# Patient Record
Sex: MALE | Race: WHITE | Employment: OTHER | URBAN - NONMETROPOLITAN AREA
[De-identification: names, ages, dates, MRNs, and addresses within clinical notes are randomized per-mention and may not be internally consistent; named-entity substitution may affect disease eponyms.]

---

## 2017-09-20 ENCOUNTER — COMMUNICATION - GICH (OUTPATIENT)
Dept: FAMILY MEDICINE | Facility: OTHER | Age: 62
End: 2017-09-20

## 2017-09-20 DIAGNOSIS — I10 ESSENTIAL (PRIMARY) HYPERTENSION: ICD-10-CM

## 2017-11-06 ENCOUNTER — HISTORY (OUTPATIENT)
Dept: FAMILY MEDICINE | Facility: OTHER | Age: 62
End: 2017-11-06

## 2017-11-06 ENCOUNTER — OFFICE VISIT - GICH (OUTPATIENT)
Dept: FAMILY MEDICINE | Facility: OTHER | Age: 62
End: 2017-11-06

## 2017-11-06 DIAGNOSIS — E11.9 TYPE 2 DIABETES MELLITUS WITHOUT COMPLICATIONS (H): ICD-10-CM

## 2017-11-06 DIAGNOSIS — Z00.00 ENCOUNTER FOR GENERAL ADULT MEDICAL EXAMINATION WITHOUT ABNORMAL FINDINGS: ICD-10-CM

## 2017-11-06 DIAGNOSIS — I10 ESSENTIAL (PRIMARY) HYPERTENSION: ICD-10-CM

## 2017-11-06 LAB
A/G RATIO - HISTORICAL: 1.3 (ref 1–2)
ABSOLUTE BASOPHILS - HISTORICAL: 0.1 THOU/CU MM
ABSOLUTE EOSINOPHILS - HISTORICAL: 0.3 THOU/CU MM
ABSOLUTE IMMATURE GRANULOCYTES(METAS,MYELOS,PROS) - HISTORICAL: 0 THOU/CU MM
ABSOLUTE LYMPHOCYTES - HISTORICAL: 1.4 THOU/CU MM (ref 0.9–2.9)
ABSOLUTE MONOCYTES - HISTORICAL: 0.4 THOU/CU MM
ABSOLUTE NEUTROPHILS - HISTORICAL: 2.4 THOU/CU MM (ref 1.7–7)
ALBUMIN SERPL-MCNC: 4.3 G/DL (ref 3.5–5.7)
ALP SERPL-CCNC: 60 IU/L (ref 34–104)
ALT (SGPT) - HISTORICAL: 17 IU/L (ref 7–52)
ANION GAP - HISTORICAL: 13 (ref 5–18)
AST SERPL-CCNC: 16 IU/L (ref 13–39)
BASOPHILS # BLD AUTO: 1.1 %
BILIRUB SERPL-MCNC: 0.4 MG/DL (ref 0.3–1)
BILIRUB UR QL: NEGATIVE
BUN SERPL-MCNC: 19 MG/DL (ref 7–25)
BUN/CREAT RATIO - HISTORICAL: 18
CALCIUM SERPL-MCNC: 9.8 MG/DL (ref 8.6–10.3)
CHLORIDE SERPLBLD-SCNC: 103 MMOL/L (ref 98–107)
CHOL/HDL RATIO - HISTORICAL: 5.11
CHOLESTEROL TOTAL: 184 MG/DL
CLARITY, URINE: CLEAR CLARITY
CO2 SERPL-SCNC: 25 MMOL/L (ref 21–31)
COLOR UR: YELLOW COLOR
CREAT SERPL-MCNC: 1.06 MG/DL (ref 0.7–1.3)
EOSINOPHIL NFR BLD AUTO: 7.5 %
ERYTHROCYTE [DISTWIDTH] IN BLOOD BY AUTOMATED COUNT: 12.9 % (ref 11.5–15.5)
ESTIMATED AVERAGE GLUCOSE: 126 MG/DL
GFR IF NOT AFRICAN AMERICAN - HISTORICAL: >60 ML/MIN/1.73M2
GLOBULIN - HISTORICAL: 3.3 G/DL (ref 2–3.7)
GLUCOSE SERPL-MCNC: 145 MG/DL (ref 70–105)
GLUCOSE URINE: NEGATIVE MG/DL
HCT VFR BLD AUTO: 38.6 % (ref 37–53)
HDLC SERPL-MCNC: 36 MG/DL (ref 23–92)
HEMOGLOBIN A1C MONITORING (POCT) - HISTORICAL: 6 % (ref 4–6.2)
HEMOGLOBIN: 13.2 G/DL (ref 13.5–17.5)
IMMATURE GRANULOCYTES(METAS,MYELOS,PROS) - HISTORICAL: 0 %
KETONES UR QL: NEGATIVE MG/DL
LDLC SERPL CALC-MCNC: 121 MG/DL
LEUKOCYTE ESTERASE URINE: NEGATIVE
LYMPHOCYTES NFR BLD AUTO: 30.6 % (ref 20–44)
MCH RBC QN AUTO: 30.9 PG (ref 26–34)
MCHC RBC AUTO-ENTMCNC: 34.2 G/DL (ref 32–36)
MCV RBC AUTO: 90 FL (ref 80–100)
MONOCYTES NFR BLD AUTO: 8.6 %
NEUTROPHILS NFR BLD AUTO: 52.2 % (ref 42–72)
NITRITE UR QL STRIP: NEGATIVE
NON-HDL CHOLESTEROL - HISTORICAL: 148 MG/DL
OCCULT BLOOD,URINE - HISTORICAL: NEGATIVE
PH UR: 6 [PH]
PLATELET # BLD AUTO: 192 THOU/CU MM (ref 140–440)
PMV BLD: 9.6 FL (ref 6.5–11)
POTASSIUM SERPL-SCNC: 4.4 MMOL/L (ref 3.5–5.1)
PROT SERPL-MCNC: 7.6 G/DL (ref 6.4–8.9)
PROTEIN QUALITATIVE,URINE - HISTORICAL: NEGATIVE MG/DL
PROVIDER ORDERDED STATUS - HISTORICAL: ABNORMAL
RED BLOOD COUNT - HISTORICAL: 4.27 MIL/CU MM (ref 4.3–5.9)
SODIUM SERPL-SCNC: 141 MMOL/L (ref 133–143)
SP GR UR STRIP: 1.02
TRIGL SERPL-MCNC: 135 MG/DL
TSH - HISTORICAL: 1.36 UIU/ML (ref 0.34–5.6)
UROBILINOGEN,QUALITATIVE - HISTORICAL: NORMAL EU/DL
WHITE BLOOD COUNT - HISTORICAL: 4.5 THOU/CU MM (ref 4.5–11)

## 2017-12-28 NOTE — PROGRESS NOTES
Patient Information     Patient Name MRN Rosalio Ramachandran [id number] Male 1955      Progress Notes by Viral Mora MD at 2017  7:45 AM     Author:  Viral Mora MD Service:  (none) Author Type:  Physician     Filed:  2017  8:33 AM Encounter Date:  2017 Status:  Signed     :  Viral Mora MD (Physician)            ,

## 2017-12-28 NOTE — TELEPHONE ENCOUNTER
Patient Information     Patient Name MRN Sex Rosalio Alicia [id number] Male 1955      Telephone Encounter by Damaris Martin RN at 2017 11:18 AM     Author:  Damaris Martin RN Service:  (none) Author Type:  NURS- Registered Nurse     Filed:  2017 11:20 AM Encounter Date:  2017 Status:  Signed     :  Damaris Martin RN (NURS- Registered Nurse)            Ace Inhibitors    Office visit in the past 12 months or per provider note.    Last visit with SHAYLEE OLMEDO was on: 10/18/2016 in [facility]  Next visit with SHAYLEE OLMEDO is on: 2017 in [facility]  Next visit with Family Practice is on: 2017 in [facility]    Lab test requirements:  Creatinine and Potassium annually, if ordering lab, order BMP.  CREATININE (mg/dL)    Date Value   10/13/2015 0.98     POTASSIUM (mmol/L)    Date Value   10/13/2015 4.3       Max refill for 12 months from last office visit or per provider note    Blood pressure addressed at annual exam last year. Controlled, no changes made.    Patient is due for medication management appointment. Limited refill provided at this time. Noted upcoming appointment. Prescription refilled per RN Medication Refill Policy.................... Damaris Martin RN ....................  2017   11:19 AM

## 2017-12-29 NOTE — H&P
Patient Information     Patient Name MRN Rosalio Ramachandran [id number] Male 1955      H&P by Viral Mora MD at 2017  7:45 AM     Author:  Viral Mora MD Service:  (none) Author Type:  Physician     Filed:  2017  8:33 AM Encounter Date:  2017 Status:  Signed     :  Viral Mora MD (Physician)            Nursing Notes:   Princess Stack  2017  7:54 AM  Signed  Patient presents to the clinic for an annual physical  Princess Stack LPN....................2017 7:44 AM    Previous A1C is at goal of <8  HEMOGLOBIN A1C MONITORING (POCT) (%)    Date Value   10/18/2016 6.7 (H)     Urine microalbumin:creatine:   Foot exam today  Eye exam 2017    Patient is not a current smoker  Patient is on a daily aspirin  Patient is not on a Statin.  Blood pressure today of 136/76 is at the goal of <139/89 for diabetics.    Princess Stack LPN..............2017 7:46 AM    Rosalio Pierre is a 61 y.o. male who presents for   Chief Complaint     Patient presents with       Physical      Annual px     HPI: Mr. Pierre comes in stating he has had good BPs at home but sugars have gone up some with AM of 125 and later in the day he will get 120-140... He is due for A1C. He did see eye Dr this summer with satisfactory exam. He has no foot problems. Hehas no complaints today. He is due for labs. Lipids last year showed LDL of 113. He does exercise regularly.. We discussed diet some. He [place]aid in [place] and is retired from "[employer]".   No past medical history on file.  Past Surgical History:      Procedure  Laterality Date     COLONOSCOPY      normal - [place]       PAST SURGICAL HISTORY  2009    Umbilical herniorrhaphy with mesh underlay       Family History       Problem   Relation Age of Onset     Other  Father 57     parkinsons  81       Current Outpatient Prescriptions       Medication  Sig Dispense Refill     aspirin (ECOTRIN) 81 mg enteric coated tablet Take 1 tablet by  "mouth once daily with a meal.  0     blood sugar diagnostic (BLOOD GLUCOSE TEST) strip Dispense item covered by pt ins. 250.00 NIDDM type II - Test 1 time/day 100 Each 3     lancets (ACCU-CHEK MULTICLIX LANCET) Dispense item covered by pt ins. 250.00 NIDDM type II - Test 1 time/day 90 Each 3     metFORMIN (GLUCOPHAGE) 500 mg tablet 1 tablet in the AM and 2 in the  tablet 3     multivitamin (MVI) tablet Take 1 tablet by mouth once daily.  0     ramipril (ALTACE) 10 mg capsule TAKE 1 CAPSULE BY MOUTH EVERY DAY 90 capsule 0     No current facility-administered medications for this visit.      Medications have been reviewed by me and are current to the best of my knowledge and ability.    No Known Allergies  REVIEW OF SYSTEMS:  Constitutional: Negative  Eyes: see above  Ears, nose, mouth, throat, and face: reduced/ uses head phones for TV  Respiratory: Negative  Cardiovascular: Negative  Gastrointestinal: Negative  Genitourinary:Negative  Integument/breast: Negative  Hematologic/lymphatic: Negative  Musculoskeletal:Negative some R hip pain rarely that does not slow him  Neurological: Negative  Psychiatric: Negative  Allergic/Immunologic: Negative     EXAM:   Vitals:     11/06/17 0745   BP: 136/76   Weight: 88.5 kg (195 lb 3.2 oz)   Height: 1.765 m (5' 9.5\")     General Appearance: No distress - comfortable  Head Exam: Normocephalic, without obvious abnormality.  Ear Exam: Normal TM's bilaterally. Normal auditory canals and external ears. Non-tender.  OroPharynx Exam: Dental hygiene adequate. Normal buccal mucosa. Normal pharynx.  Neck Exam: Supple, no masses or nodes.  Thyroid Exam: No nodules or enlargement.  Chest/Respiratory Exam: Normal chest wall and respirations. Clear to auscultation.  Cardiovascular Exam: Regular rate and rhythm. S1, S2, no murmur, click, gallop, or rubs.  Gastrointestinal Exam: Soft, nontender, no abnormal masses or organomegaly.  Rectal Exam: good sphincter tone  Genitourinary Exam Male: " Normal.  Lymphatic Exam: Non-palpable nodes in neck, clavicular, axillary, or inguinal regions.  Foot Exam: Left and right foot: monofilament sensation normal, good pedal pulses, no lesions, nail hygiene good.  Skin: no rash or abnormalities  Neurologic Exam: Nonfocal;  normal gross motor movement, tone, and coordination. No tremor.  Psychiatric Exam: Alert and oriented, appropriate affect.  ASSESSMENT AND PLAN:  1. HTN (hypertension)  controlled  - ramipril (ALTACE) 10 mg capsule; Take 1 capsule by mouth once daily.  Dispense: 90 capsule; Refill: 3    2. Controlled type 2 diabetes mellitus without complication, without long-term current use of insulin (HC)  Lab pending- may increase metformin to 1000 bid  - metFORMIN (GLUCOPHAGE) 500 mg tablet; 1 tablet in the AM and 2 in the PM  Dispense: 270 tablet; Refill: 3  - blood sugar diagnostic (BLOOD GLUCOSE TEST) strip; Dispense item covered by pt ins. 250.00 NIDDM type II - Test 1 time/day  Dispense: 100 Each; Refill: 3    3. Health care maintenance  lab

## 2017-12-30 NOTE — NURSING NOTE
Patient Information     Patient Name MRN Sex Rosalio Alicia [id number] Male 1955      Nursing Note by Princess Stack at 2017  7:45 AM     Author:  Princess Stack Service:  (none) Author Type:  (none)     Filed:  2017  7:54 AM Encounter Date:  2017 Status:  Signed     :  Princess Stack            Patient presents to the clinic for an annual physical  Princess Stack LPN....................2017 7:44 AM    Previous A1C is at goal of <8  HEMOGLOBIN A1C MONITORING (POCT) (%)    Date Value   10/18/2016 6.7 (H)     Urine microalbumin:creatine:   Foot exam today  Eye exam 2017    Patient is not a current smoker  Patient is on a daily aspirin  Patient is not on a Statin.  Blood pressure today of 136/76 is at the goal of <139/89 for diabetics.    Princess Stack LPN..............2017 7:46 AM

## 2018-01-27 VITALS
DIASTOLIC BLOOD PRESSURE: 76 MMHG | BODY MASS INDEX: 27.94 KG/M2 | SYSTOLIC BLOOD PRESSURE: 136 MMHG | HEIGHT: 70 IN | WEIGHT: 195.2 LBS

## 2018-02-19 ENCOUNTER — DOCUMENTATION ONLY (OUTPATIENT)
Dept: FAMILY MEDICINE | Facility: OTHER | Age: 63
End: 2018-02-19

## 2018-02-19 RX ORDER — DIPHENOXYLATE HYDROCHLORIDE AND ATROPINE SULFATE 2.5; .025 MG/1; MG/1
1 TABLET ORAL DAILY
COMMUNITY

## 2018-02-19 RX ORDER — ASPIRIN 81 MG/1
81 TABLET ORAL
COMMUNITY

## 2018-02-19 RX ORDER — RAMIPRIL 10 MG/1
10 CAPSULE ORAL DAILY
COMMUNITY
Start: 2017-11-06 | End: 2018-10-18

## 2018-06-26 ENCOUNTER — TRANSFERRED RECORDS (OUTPATIENT)
Dept: HEALTH INFORMATION MANAGEMENT | Facility: OTHER | Age: 63
End: 2018-06-26

## 2018-07-23 NOTE — PROGRESS NOTES
Patient Information     Patient Name  Rosalio Pierre MRN  [id number] Sex  Male   1955      Letter by Viral Mora MD at      Author:  Viral Mora MD Service:  (none) Author Type:  (none)    Filed:   Encounter Date:  2017 Status:  (Other)           Rosalio Pierre  [address]  [place] [address]          2017    Dear Mr. Pierre:    Your labs look even better than last year but please eat less red meat. Keep up the good work.  Viral Mora MD ....................  2017   11:57 AM     Results for orders placed or performed in visit on 17       COMP METABOLIC PANEL       Result  Value Ref Range Status    SODIUM 141 133 - 143 mmol/L Final    POTASSIUM 4.4 3.5 - 5.1 mmol/L Final    CHLORIDE 103 98 - 107 mmol/L Final    CO2,TOTAL 25 21 - 31 mmol/L Final    ANION GAP 13 5 - 18                 Final    GLUCOSE 145 (H) 70 - 105 mg/dL Final    CALCIUM 9.8 8.6 - 10.3 mg/dL Final    BUN 19 7 - 25 mg/dL Final    CREATININE 1.06 0.70 - 1.30 mg/dL Final    BUN/CREAT RATIO           18                 Final    GFR if African American >60 >60 ml/min/1.73m2 Final    GFR if not African American >60 >60 ml/min/1.73m2 Final    ALBUMIN 4.3 3.5 - 5.7 g/dL Final    PROTEIN,TOTAL 7.6 6.4 - 8.9 g/dL Final    GLOBULIN                  3.3 2.0 - 3.7 g/dL Final    A/G RATIO 1.3 1.0 - 2.0                 Final    BILIRUBIN,TOTAL 0.4 0.3 - 1.0 mg/dL Final    ALK PHOSPHATASE 60 34 - 104 IU/L Final    ALT (SGPT) 17 7 - 52 IU/L Final    AST (SGOT) 16 13 - 39 IU/L Final   HEMOGLOBIN A1C MONITORING (POCT)       Result  Value Ref Range Status    HEMOGLOBIN A1C MONITORING (POCT) 6.0 4.0 - 6.2 % Final    ESTIMATED AVERAGE GLUCOSE  126 mg/dL Final   TSH       Result  Value Ref Range Status    TSH 1.36 0.34 - 5.60 uIU/mL Final   LIPID PANEL       Result  Value Ref Range Status    CHOLESTEROL,TOTAL 184 <200 mg/dL Final    TRIGLYCERIDES 135 <150 mg/dL Final    HDL CHOLESTEROL 36 23 - 92 mg/dL Final     NON-HDL CHOLESTEROL 148 (H) <145 mg/dl Final    CHOL/HDL RATIO            5.11 (H) <4.50                 Final    LDL CHOLESTEROL 121 (H) <100 mg/dL Final    PROVIDER ORDERED STATUS RANDOM  Final   CBC WITH AUTO DIFFERENTIAL       Result  Value Ref Range Status    WHITE BLOOD COUNT         4.5 4.5 - 11.0 thou/cu mm Final    RED BLOOD COUNT           4.27 (L) 4.30 - 5.90 mil/cu mm Final    HEMOGLOBIN                13.2 (L) 13.5 - 17.5 g/dL Final    HEMATOCRIT                38.6 37.0 - 53.0 % Final    MCV                       90 80 - 100 fL Final    MCH                       30.9 26.0 - 34.0 pg Final    MCHC                      34.2 32.0 - 36.0 g/dL Final    RDW                       12.9 11.5 - 15.5 % Final    PLATELET COUNT            192 140 - 440 thou/cu mm Final    MPV                       9.6 6.5 - 11.0 fL Final    NEUTROPHILS               52.2 42.0 - 72.0 % Final    LYMPHOCYTES               30.6 20.0 - 44.0 % Final    MONOCYTES                 8.6 <12.0 % Final    EOSINOPHILS               7.5 <8.0 % Final    BASOPHILS                 1.1 <3.0 % Final    IMMATURE GRANULOCYTES(METAS,MYELOS,PROS) 0.0 % Final    ABSOLUTE NEUTROPHILS      2.4 1.7 - 7.0 thou/cu mm Final    ABSOLUTE LYMPHOCYTES      1.4 0.9 - 2.9 thou/cu mm Final    ABSOLUTE MONOCYTES        0.4 <0.9 thou/cu mm Final    ABSOLUTE EOSINOPHILS      0.3 <0.5 thou/cu mm Final    ABSOLUTE BASOPHILS        0.1 <0.3 thou/cu mm Final    ABSOLUTE IMMATURE GRANULOCYTES(METAS,MYELOS,PROS) 0.0 <=0.3 thou/cu mm Final   URINALYSIS W REFLEX MICROSCOPIC IF POSITIVE       Result  Value Ref Range Status    COLOR                     Yellow Yellow Color Final    CLARITY                   Clear Clear Clarity Final    SPECIFIC GRAVITY,URINE    1.025 1.010, 1.015, 1.020, 1.025                 Final    PH,URINE                  6.0 6.0, 7.0, 8.0, 5.5, 6.5, 7.5, 8.5                 Final    UROBILINOGEN,QUALITATIVE  Normal Normal EU/dl Final    PROTEIN, URINE Negative  Negative mg/dL Final    GLUCOSE, URINE Negative Negative mg/dL Final    KETONES,URINE             Negative Negative mg/dL Final    BILIRUBIN,URINE           Negative Negative                 Final    OCCULT BLOOD,URINE        Negative Negative                 Final    NITRITE                   Negative Negative                 Final    LEUKOCYTE ESTERASE        Negative Negative                 Final

## 2018-10-18 DIAGNOSIS — E11.9 TYPE 2 DIABETES MELLITUS WITHOUT COMPLICATION, WITHOUT LONG-TERM CURRENT USE OF INSULIN (H): ICD-10-CM

## 2018-10-18 DIAGNOSIS — I10 ESSENTIAL (PRIMARY) HYPERTENSION: Primary | ICD-10-CM

## 2018-10-22 NOTE — TELEPHONE ENCOUNTER
[facility] sent Rx request for the following:     METFORMIN 500MG TABLETS  TAKE 2 TABLETS BY MOUTH TWICE DAILY WITH MEALS  Last Written Prescription Date:  17  Last Fill Quantity: 360,   # refills: 3    Routing refill request to provider for review/approval because:  Biguanide Agents Tmffir09/22 3:13 PM   Blood pressure less than 140/90 in past 6 months    Patient has had a Microalbumin in the past 15 mos.    Patient has documented A1c within the specified period of time.    Recent (6 mo) or future (30 days) visit within the authorizing provider's specialty     Per LOV note:  Lab pending- may increase metformin to 1000 bid.     Per review of chart in [facility]:  It appears as though this was increased, but sig is unclear:  metFORMIN (GLUCOPHAGE) 500 mg tablet 360 tablet 3 2017  No   Si tablets 2 times daily with meals. 1 tablet in the AM and 2 in the PM   Sent to pharmacy as: metFORMIN 500 mg tablet   Class: eRx   E-Prescribing Status: Receipt confirmed by pharmacy (2017  8:16 AM CST)     RAMIPRIL 10MG CAPSULES  TAKE 1 CAPSULE BY MOUTH EVERY DAY  Last Written Prescription Date:  17  Last Fill Quantity: 90,   # refills: 3      Last Office Visit: 17  Future Office visit:   Next 5 appointments (look out 90 days)     2018 11:00 AM CST   PHYSICAL with Deven Canseco MD   [facility] ([facility])    [address]  [facility] [address]   [phone]                In the absence of Dr. Mora, writer will route to Dr. Canseco for refill consideration, as Pt has an upcoming appointment to see him. Unable to complete prescription refill per RN Medication Refill Policy. Anna Joshua RN .............. 10/23/2018  10:04 AM

## 2018-10-23 RX ORDER — RAMIPRIL 10 MG/1
CAPSULE ORAL
Qty: 30 CAPSULE | Refills: 0 | Status: SHIPPED | OUTPATIENT
Start: 2018-10-23 | End: 2018-11-08

## 2018-11-08 ENCOUNTER — OFFICE VISIT (OUTPATIENT)
Dept: PEDIATRICS | Facility: OTHER | Age: 63
End: 2018-11-08
Attending: INTERNAL MEDICINE
Payer: COMMERCIAL

## 2018-11-08 VITALS
WEIGHT: 200 LBS | DIASTOLIC BLOOD PRESSURE: 72 MMHG | BODY MASS INDEX: 28.63 KG/M2 | HEART RATE: 60 BPM | SYSTOLIC BLOOD PRESSURE: 126 MMHG | HEIGHT: 70 IN

## 2018-11-08 DIAGNOSIS — Z76.89 ENCOUNTER TO ESTABLISH CARE: Primary | ICD-10-CM

## 2018-11-08 DIAGNOSIS — Z12.5 SCREENING FOR PROSTATE CANCER: ICD-10-CM

## 2018-11-08 DIAGNOSIS — E78.2 MIXED HYPERLIPIDEMIA: ICD-10-CM

## 2018-11-08 DIAGNOSIS — I10 ESSENTIAL (PRIMARY) HYPERTENSION: ICD-10-CM

## 2018-11-08 DIAGNOSIS — E11.9 TYPE 2 DIABETES MELLITUS WITHOUT COMPLICATION, WITHOUT LONG-TERM CURRENT USE OF INSULIN (H): ICD-10-CM

## 2018-11-08 LAB
ANION GAP SERPL CALCULATED.3IONS-SCNC: 8 MMOL/L (ref 3–14)
BUN SERPL-MCNC: 21 MG/DL (ref 7–25)
CALCIUM SERPL-MCNC: 9.8 MG/DL (ref 8.6–10.3)
CHLORIDE SERPL-SCNC: 105 MMOL/L (ref 98–107)
CHOLEST SERPL-MCNC: 204 MG/DL
CO2 SERPL-SCNC: 26 MMOL/L (ref 21–31)
CREAT SERPL-MCNC: 1.25 MG/DL (ref 0.7–1.3)
GFR SERPL CREATININE-BSD FRML MDRD: 58 ML/MIN/1.7M2
GLUCOSE SERPL-MCNC: 136 MG/DL (ref 70–105)
HBA1C MFR BLD: 7.1 % (ref 4–6)
HDLC SERPL-MCNC: 36 MG/DL (ref 23–92)
LDLC SERPL CALC-MCNC: 135 MG/DL
NONHDLC SERPL-MCNC: 168 MG/DL
POTASSIUM SERPL-SCNC: 5.1 MMOL/L (ref 3.5–5.1)
PSA SERPL-ACNC: 3.23 NG/ML
SODIUM SERPL-SCNC: 139 MMOL/L (ref 134–144)
TRIGL SERPL-MCNC: 163 MG/DL

## 2018-11-08 PROCEDURE — 80048 BASIC METABOLIC PNL TOTAL CA: CPT | Performed by: INTERNAL MEDICINE

## 2018-11-08 PROCEDURE — 83036 HEMOGLOBIN GLYCOSYLATED A1C: CPT | Performed by: INTERNAL MEDICINE

## 2018-11-08 PROCEDURE — G0103 PSA SCREENING: HCPCS | Performed by: INTERNAL MEDICINE

## 2018-11-08 PROCEDURE — 99396 PREV VISIT EST AGE 40-64: CPT | Performed by: INTERNAL MEDICINE

## 2018-11-08 PROCEDURE — 80061 LIPID PANEL: CPT | Performed by: INTERNAL MEDICINE

## 2018-11-08 PROCEDURE — 36415 COLL VENOUS BLD VENIPUNCTURE: CPT | Performed by: INTERNAL MEDICINE

## 2018-11-08 RX ORDER — SIMVASTATIN 20 MG
20 TABLET ORAL AT BEDTIME
Qty: 90 TABLET | Refills: 3 | Status: SHIPPED | OUTPATIENT
Start: 2018-11-08 | End: 2019-11-08

## 2018-11-08 RX ORDER — RAMIPRIL 10 MG/1
10 CAPSULE ORAL DAILY
Qty: 90 CAPSULE | Refills: 3 | Status: SHIPPED | OUTPATIENT
Start: 2018-11-08 | End: 2019-11-08

## 2018-11-08 ASSESSMENT — PAIN SCALES - GENERAL: PAINLEVEL: NO PAIN (0)

## 2018-11-08 NOTE — PATIENT INSTRUCTIONS
-- Recommend Pneumovax, Tdap and Shingrix    Aspects of Diabetes we can improve:  Hemoglobin A1c No results found for: A1C Goal range is under 8. Best is 6.5 to 7   Blood Pressure 126/72 Goal to keep less than 140/90   Tobacco  reports that he has never smoked. He has never used smokeless tobacco. Goal to abstain from tobacco   Aspirin yes Aspirin reduces risk of heart disease and stroke   ACE/ARB ramipril These medications reduce risk of kidney disease   Cholesterol Start simvastatin Statins reduce risk of heart disease and stroke   Eye Exam 6/18 Annual diabetic eye exam   Healthy weight Body mass index is 29.11 kg/(m^2). Goal BMI under 30, best is under 25.      -- I'm trying to exercise daily (goal at least 20 min/day) with moderate aerobic activity   -- Eat healthy (resources from ADA at http://www.diabetes.org/)   -- I'm taking good care of my feet. Consider seeing the Podiatrist   -- Check blood sugars as directed, record in log book and bring to every appointment   -- Next diabetes lab draw: 6-12 months   -- Next diabetes office visit: 6-12 months

## 2018-11-08 NOTE — LETTER
November 8, 2018      Rosalio Pierre  [address]  [place] [address]        Dear ,    We are writing to inform you of your test results.    PSA only slightly above normal.  We can check this again next year.  A1c has increased.  Work on 5-10% weight loss, daily exercise, reduced carbohydrate intake.  No additional pills needed at this point.    Resulted Orders   Hemoglobin A1c   Result Value Ref Range    Hemoglobin A1C 7.1 (H) 4.0 - 6.0 %   Basic metabolic panel   Result Value Ref Range    Sodium 139 134 - 144 mmol/L    Potassium 5.1 3.5 - 5.1 mmol/L    Chloride 105 98 - 107 mmol/L    Carbon Dioxide 26 21 - 31 mmol/L    Anion Gap 8 3 - 14 mmol/L    Glucose 136 (H) 70 - 105 mg/dL    Urea Nitrogen 21 7 - 25 mg/dL    Creatinine 1.25 0.70 - 1.30 mg/dL    GFR Estimate 58 (L) >60 mL/min/1.7m2    GFR Estimate If Black 71 >60 mL/min/1.7m2    Calcium 9.8 8.6 - 10.3 mg/dL   Lipid Profile   Result Value Ref Range    Cholesterol 204 (H) <200 mg/dL    Triglycerides 163 (H) <150 mg/dL      Comment:      Borderline high:  150-199 mg/dl  High:             200-499 mg/dl  Very high:       >499 mg/dl      HDL Cholesterol 36 23 - 92 mg/dL    LDL Cholesterol Calculated 135 (H) <100 mg/dL      Comment:      Above desirable:  100-129 mg/dl  Borderline High:  130-159 mg/dL  High:             160-189 mg/dL  Very high:       >189 mg/dl      Non HDL Cholesterol 168 (H) <130 mg/dL      Comment:      Above Desirable:  130-159 mg/dl  Borderline high:  160-189 mg/dl  High:             190-219 mg/dl  Very high:       >219 mg/dl     PSA Screen GH   Result Value Ref Range    PSA Screen 3.225 (H) <3.100 ng/mL       If you have any questions or concerns, please call the clinic at the number listed above.       Sincerely,        Deven Canseco MD

## 2018-11-08 NOTE — NURSING NOTE
Patient presents to clinic for PX, medications, and diabetic check today.  Airam Mcguire LPN ....................  11/8/2018   11:12 AM      No LMP for male patient.  Medication Reconciliation: complete    Airam Mcguire LPN  11/8/2018 11:12 AM    Previous A1C is at goal of <8  No results found for: A1C  Urine microalbumin:creatine: DUE  Foot exam DUE  Eye exam 6/28/18    Tobacco User NO  Patient is on a daily aspirin  Patient is not on a Statin.  Blood pressure today of:     BP Readings from Last 1 Encounters:   11/06/17 136/76      is at the goal of <139/89 for diabetics.    Airam Mcguire LPN on 11/8/2018 at 11:13 AM

## 2018-11-08 NOTE — MR AVS SNAPSHOT
After Visit Summary   11/8/2018    Rosalio Pierre    MRN: [id number]           Patient Information     Date Of Birth          1955        Visit Information        Provider Department      11/8/2018 11:00 AM Deven Canseco MD [facility]        Today's Diagnoses     Encounter to establish care    -  1    Essential (primary) hypertension        Type 2 diabetes mellitus without complication, without long-term current use of insulin (H)        Mixed hyperlipidemia        Screening for prostate cancer          Care Instructions     -- Recommend Pneumovax, Tdap and Shingrix    Aspects of Diabetes we can improve:  Hemoglobin A1c No results found for: A1C Goal range is under 8. Best is 6.5 to 7   Blood Pressure 126/72 Goal to keep less than 140/90   Tobacco  reports that he has never smoked. He has never used smokeless tobacco. Goal to abstain from tobacco   Aspirin yes Aspirin reduces risk of heart disease and stroke   ACE/ARB ramipril These medications reduce risk of kidney disease   Cholesterol Start simvastatin Statins reduce risk of heart disease and stroke   Eye Exam 6/18 Annual diabetic eye exam   Healthy weight Body mass index is 29.11 kg/(m^2). Goal BMI under 30, best is under 25.      -- I'm trying to exercise daily (goal at least 20 min/day) with moderate aerobic activity   -- Eat healthy (resources from ADA at http://www.diabetes.org/)   -- I'm taking good care of my feet. Consider seeing the Podiatrist   -- Check blood sugars as directed, record in log book and bring to every appointment   -- Next diabetes lab draw: 6-12 months   -- Next diabetes office visit: 6-12 months            Follow-ups after your visit        Future tests that were ordered for you today     Open Future Orders        Priority Expected Expires Ordered    Hemoglobin A1c Routine  11/9/2019 11/8/2018    Basic metabolic panel Routine  11/9/2019 11/8/2018    Lipid Profile Routine  11/9/2019  "2018    PSA Screen GH Routine  2019            Who to contact     If you have questions or need follow up information about today's clinic visit or your schedule please contact [facility] directly at [phone].  Normal or non-critical lab and imaging results will be communicated to you by Letsmake[facility], letter or phone within 4 business days after the clinic has received the results. If you do not hear from us within 7 days, please contact the clinic through Letsmake[facility] or phone. If you have a critical or abnormal lab result, we will notify you by phone as soon as possible.  Submit refill requests through [facility] or call your pharmacy and they will forward the refill request to us. Please allow 3 business days for your refill to be completed.          Additional Information About Your Visit        Letsmakehart Information     [facility] lets you send messages to your doctor, view your test results, renew your prescriptions, schedule appointments and more. To sign up, go to [email or web address] . Click on \"Log in\" on the left side of the screen, which will take you to the Welcome page. Then click on \"Sign up Now\" on the right side of the page.     You will be asked to enter the access code listed below, as well as some personal information. Please follow the directions to create your username and password.     Your access code is: [email or web address]  Expires: 2019 10:42 AM     Your access code will  in 90 days. If you need help or a new code, please call your [facility] clinic or [phone].        Care EveryWhere ID     This is your Care EveryWhere ID. This could be used by other organizations to access your [facility] medical records  [id number]        Your Vitals Were     Pulse Height BMI (Body Mass Index)             60 5' 9.5\" (1.765 m) 29.11 kg/m2          Blood Pressure from Last 3 Encounters:   18 126/72   17 136/76   10/18/16 118/66    Weight from Last 3 " Encounters:   11/08/18 200 lb (90.7 kg)   11/06/17 195 lb 3.2 oz (88.5 kg)   10/18/16 189 lb (85.7 kg)                 Today's Medication Changes          These changes are accurate as of 11/8/18 11:42 AM.  If you have any questions, ask your nurse or doctor.               Start taking these medicines.        Dose/Directions    simvastatin 20 MG tablet   Commonly known as:  ZOCOR   Used for:  Type 2 diabetes mellitus without complication, without long-term current use of insulin (H), Mixed hyperlipidemia   Started by:  Deven Canseco MD        Dose:  20 mg   Take 1 tablet (20 mg) by mouth At Bedtime   Quantity:  90 tablet   Refills:  3         These medicines have changed or have updated prescriptions.        Dose/Directions    metFORMIN 500 MG tablet   Commonly known as:  GLUCOPHAGE   This may have changed:  See the new instructions.   Used for:  Type 2 diabetes mellitus without complication, without long-term current use of insulin (H)   Changed by:  Deven Canseco MD        Dose:  1000 mg   Take 2 tablets (1,000 mg) by mouth 2 times daily (with meals)   Quantity:  360 tablet   Refills:  3       ramipril 10 MG capsule   Commonly known as:  ALTACE   This may have changed:  See the new instructions.   Used for:  Essential (primary) hypertension   Changed by:  Deven Canseco MD        Dose:  10 mg   Take 1 capsule (10 mg) by mouth daily   Quantity:  90 capsule   Refills:  3            Where to get your medicines      These medications were sent to [facility] [address] [place], [place] - [address], [facility] [address]     Phone:  [phone]     metFORMIN 500 MG tablet    ramipril 10 MG capsule    simvastatin 20 MG tablet                Primary Care Provider Office Phone # Fax #    Deven Canseco [phone]       [address] [facility] [address]        Equal Access to Services     HOWARD GEE AH:  Hadii aad ku hadkailameagan Soyoditali, waaxda luqadaha, qaybta kaalazeem de souza, magali ramonin hayaan johndeann krausemicki cheryl. So [facility] [phone].    ATENCIÓN: Si himala antonella, tiene a clark disposición servicios gratuitos de asistencia lingüística. Llame al [phone].    We comply with applicable federal civil rights laws and [place] laws. We do not discriminate on the basis of race, color, national origin, age, disability, sex, sexual orientation, or gender identity.            Thank you!     Thank you for choosing [facility] AND [facility]  for your care. Our goal is always to provide you with excellent care. Hearing back from our patients is one way we can continue to improve our services. Please take a few minutes to complete the written survey that you may receive in the mail after your visit with us. Thank you!             Your Updated Medication List - Protect others around you: Learn how to safely use, store and throw away your medicines at www.disposemymeds.org.          This list is accurate as of 11/8/18 11:42 AM.  Always use your most recent med list.                   Brand Name Dispense Instructions for use Diagnosis    aspirin 81 MG EC tablet      Take 81 mg by mouth daily with food        blood glucose monitoring lancets      Dispense item covered by pt ins. 250.00 NIDDM type II - Test 1 time/day        COOL BLOOD GLUCOSE TEST STRIPS test strip   Generic drug:  blood glucose monitoring      Dispense item covered by pt ins. 250.00 NIDDM type II - Test 1 time/day        metFORMIN 500 MG tablet    GLUCOPHAGE    360 tablet    Take 2 tablets (1,000 mg) by mouth 2 times daily (with meals)    Type 2 diabetes mellitus without complication, without long-term current use of insulin (H)       MULTI-VITAMINS Tabs      Take 1 tablet by mouth daily        ramipril 10 MG capsule    ALTACE    90 capsule    Take 1 capsule (10 mg) by mouth daily    Essential (primary) hypertension       simvastatin 20 MG tablet     ZOCOR    90 tablet    Take 1 tablet (20 mg) by mouth At Bedtime    Type 2 diabetes mellitus without complication, without long-term current use of insulin (H), Mixed hyperlipidemia

## 2018-11-10 NOTE — PROGRESS NOTES
"Subjective  Rosalio Pierre is a 62 year old male who presents for annual physical, establish primary care.  Previous physician was Dr. Mora.  He has a history of diabetes mellitus type 2 which is well-controlled with metformin.  History of hypertension stable with ramipril.  He continues on aspirin for prophylaxis.  No chest pains with exertion.  No diarrhea.  No change in urinary symptoms.    Problem List/PMH: reviewed in EMR, and made relevant updates today.  Medications: reviewed in EMR, and made relevant updates today.  Allergies: reviewed in EMR, and made relevant updates today.    Social Hx:  Social History   Substance Use Topics     Smoking status: Never Smoker     Smokeless tobacco: Never Used     Alcohol use Yes      Comment: Alcoholic Drinks/day: Seldom     Social History     Social History Narrative    Lives on [facility] in [place]     I reviewed social history and made relevant updates today.    Family Hx:   Family History   Problem Relation Age of Onset     Parkinsonism Father 57     parkinsons,  81     HEART DISEASE Mother      CHF,  in 80s     No Known Problems Sister      No Known Problems Brother      No Known Problems Brother      Multiple Sclerosis Brother      Prostate Cancer No family hx of      Colon Cancer No family hx of      Lupus No family hx of      Sjogren's No family hx of      Thyroid Disease No family hx of        Objective  Vitals: reviewed in EMR.  /72 (BP Location: Right arm, Patient Position: Sitting, Cuff Size: Adult Large)  Pulse 60  Ht 5' 9.5\" (1.765 m)  Wt 200 lb (90.7 kg)  BMI 29.11 kg/m2    Gen: Pleasant male, NAD.  HEENT: MMM, no OP erythema.   Neck: Supple, no JVD, no bruits.  CV: RRR no m/r/g.   Pulm: CTAB no w/r/r  GI: Soft, NT, ND. No HSM. No masses. Bowel sounds present.  Neuro: Grossly intact  Msk: No lower extremity edema.  Skin: No concerning lesions.  Psychiatric: Normal affect and insight. Does not appear anxious or " depressed.    Labs:  Glucose   Date Value Ref Range Status   11/08/2018 136 (H) 70 - 105 mg/dL Final   11/06/2017 145 (H) 70 - 105 mg/dL      Hemoglobin A1C   Date Value Ref Range Status   11/08/2018 7.1 (H) 4.0 - 6.0 % Final     Creatinine   Date Value Ref Range Status   11/08/2018 1.25 0.70 - 1.30 mg/dL Final   11/06/2017 1.06 0.70 - 1.30 mg/dL      LDL Cholesterol Calculated   Date Value Ref Range Status   11/08/2018 135 (H) <100 mg/dL Final     Comment:     Above desirable:  100-129 mg/dl  Borderline High:  130-159 mg/dL  High:             160-189 mg/dL  Very high:       >189 mg/dl               Assessment    ICD-10-CM    1. Encounter to establish care Z76.89    2. Essential (primary) hypertension I10 ramipril (ALTACE) 10 MG capsule     Basic metabolic panel     Basic metabolic panel   3. Type 2 diabetes mellitus without complication, without long-term current use of insulin (H) E11.9 metFORMIN (GLUCOPHAGE) 500 MG tablet     simvastatin (ZOCOR) 20 MG tablet     Hemoglobin A1c     Hemoglobin A1c   4. Mixed hyperlipidemia E78.2 simvastatin (ZOCOR) 20 MG tablet     Lipid Profile     Lipid Profile   5. Screening for prostate cancer Z12.5 PSA Screen GH     PSA Screen GH     Orders Placed This Encounter   Procedures     Hemoglobin A1c     Basic metabolic panel     Lipid Profile     PSA Screen GH       Plan   -- Expected clinical course discussed   -- Medications and their side effects discussed  Patient Instructions      -- Recommend Pneumovax, Tdap and Shingrix    Aspects of Diabetes we can improve:  Hemoglobin A1c No results found for: A1C Goal range is under 8. Best is 6.5 to 7   Blood Pressure 126/72 Goal to keep less than 140/90   Tobacco  reports that he has never smoked. He has never used smokeless tobacco. Goal to abstain from tobacco   Aspirin yes Aspirin reduces risk of heart disease and stroke   ACE/ARB ramipril These medications reduce risk of kidney disease   Cholesterol Start simvastatin Statins reduce  risk of heart disease and stroke   Eye Exam 6/18 Annual diabetic eye exam   Healthy weight Body mass index is 29.11 kg/(m^2). Goal BMI under 30, best is under 25.      -- I'm trying to exercise daily (goal at least 20 min/day) with moderate aerobic activity   -- Eat healthy (resources from ADA at http://www.diabetes.org/)   -- I'm taking good care of my feet. Consider seeing the Podiatrist   -- Check blood sugars as directed, record in log book and bring to every appointment   -- Next diabetes lab draw: 6-12 months   -- Next diabetes office visit: 6-12 months        Signed, Deven Canseco MD  Internal Medicine & Pediatrics

## 2019-11-07 ENCOUNTER — TRANSFERRED RECORDS (OUTPATIENT)
Dept: HEALTH INFORMATION MANAGEMENT | Facility: OTHER | Age: 64
End: 2019-11-07

## 2019-11-08 ENCOUNTER — MYC MEDICAL ADVICE (OUTPATIENT)
Dept: PEDIATRICS | Facility: OTHER | Age: 64
End: 2019-11-08

## 2019-11-08 ENCOUNTER — OFFICE VISIT (OUTPATIENT)
Dept: PEDIATRICS | Facility: OTHER | Age: 64
End: 2019-11-08
Attending: INTERNAL MEDICINE
Payer: MEDICARE

## 2019-11-08 VITALS
OXYGEN SATURATION: 97 % | TEMPERATURE: 97.3 F | DIASTOLIC BLOOD PRESSURE: 76 MMHG | WEIGHT: 196 LBS | HEIGHT: 70 IN | BODY MASS INDEX: 28.06 KG/M2 | SYSTOLIC BLOOD PRESSURE: 128 MMHG | HEART RATE: 60 BPM | RESPIRATION RATE: 14 BRPM

## 2019-11-08 DIAGNOSIS — Z13.29 SCREENING FOR THYROID DISORDER: ICD-10-CM

## 2019-11-08 DIAGNOSIS — R97.20 ELEVATED PROSTATE SPECIFIC ANTIGEN (PSA): ICD-10-CM

## 2019-11-08 DIAGNOSIS — E11.9 TYPE 2 DIABETES MELLITUS WITHOUT COMPLICATION, WITHOUT LONG-TERM CURRENT USE OF INSULIN (H): Primary | ICD-10-CM

## 2019-11-08 DIAGNOSIS — I10 ESSENTIAL (PRIMARY) HYPERTENSION: ICD-10-CM

## 2019-11-08 DIAGNOSIS — E78.2 MIXED HYPERLIPIDEMIA: ICD-10-CM

## 2019-11-08 DIAGNOSIS — R35.1 NOCTURIA: ICD-10-CM

## 2019-11-08 LAB
ANION GAP SERPL CALCULATED.3IONS-SCNC: 7 MMOL/L (ref 3–14)
BUN SERPL-MCNC: 23 MG/DL (ref 7–25)
CALCIUM SERPL-MCNC: 10 MG/DL (ref 8.6–10.3)
CHLORIDE SERPL-SCNC: 103 MMOL/L (ref 98–107)
CHOLEST SERPL-MCNC: 136 MG/DL
CO2 SERPL-SCNC: 29 MMOL/L (ref 21–31)
CREAT SERPL-MCNC: 1.11 MG/DL (ref 0.7–1.3)
GFR SERPL CREATININE-BSD FRML MDRD: 67 ML/MIN/{1.73_M2}
GLUCOSE SERPL-MCNC: 129 MG/DL (ref 70–105)
HBA1C MFR BLD: 7 % (ref 4–6)
HDLC SERPL-MCNC: 39 MG/DL (ref 23–92)
LDLC SERPL CALC-MCNC: 72 MG/DL
NONHDLC SERPL-MCNC: 97 MG/DL
POTASSIUM SERPL-SCNC: 4.3 MMOL/L (ref 3.5–5.1)
PSA SERPL-MCNC: 4.66 NG/ML
SODIUM SERPL-SCNC: 139 MMOL/L (ref 134–144)
TRIGL SERPL-MCNC: 127 MG/DL
TSH SERPL DL<=0.05 MIU/L-ACNC: 1.45 IU/ML (ref 0.34–5.6)

## 2019-11-08 PROCEDURE — 83036 HEMOGLOBIN GLYCOSYLATED A1C: CPT | Mod: ZL | Performed by: INTERNAL MEDICINE

## 2019-11-08 PROCEDURE — 36415 COLL VENOUS BLD VENIPUNCTURE: CPT | Mod: ZL | Performed by: INTERNAL MEDICINE

## 2019-11-08 PROCEDURE — 84153 ASSAY OF PSA TOTAL: CPT | Mod: ZL | Performed by: INTERNAL MEDICINE

## 2019-11-08 PROCEDURE — G0009 ADMIN PNEUMOCOCCAL VACCINE: HCPCS | Performed by: INTERNAL MEDICINE

## 2019-11-08 PROCEDURE — G0463 HOSPITAL OUTPT CLINIC VISIT: HCPCS | Performed by: INTERNAL MEDICINE

## 2019-11-08 PROCEDURE — 84443 ASSAY THYROID STIM HORMONE: CPT | Mod: ZL | Performed by: INTERNAL MEDICINE

## 2019-11-08 PROCEDURE — 80061 LIPID PANEL: CPT | Mod: ZL | Performed by: INTERNAL MEDICINE

## 2019-11-08 PROCEDURE — G0463 HOSPITAL OUTPT CLINIC VISIT: HCPCS | Mod: 25 | Performed by: INTERNAL MEDICINE

## 2019-11-08 PROCEDURE — 99214 OFFICE O/P EST MOD 30 MIN: CPT | Mod: 25 | Performed by: INTERNAL MEDICINE

## 2019-11-08 PROCEDURE — 80048 BASIC METABOLIC PNL TOTAL CA: CPT | Mod: ZL | Performed by: INTERNAL MEDICINE

## 2019-11-08 PROCEDURE — 90732 PPSV23 VACC 2 YRS+ SUBQ/IM: CPT | Performed by: INTERNAL MEDICINE

## 2019-11-08 RX ORDER — SIMVASTATIN 20 MG
20 TABLET ORAL AT BEDTIME
Qty: 90 TABLET | Refills: 3 | Status: SHIPPED | OUTPATIENT
Start: 2019-11-08 | End: 2020-11-09

## 2019-11-08 RX ORDER — RAMIPRIL 10 MG/1
10 CAPSULE ORAL DAILY
Qty: 90 CAPSULE | Refills: 3 | Status: SHIPPED | OUTPATIENT
Start: 2019-11-08 | End: 2020-11-09

## 2019-11-08 ASSESSMENT — MIFFLIN-ST. JEOR: SCORE: 1677.36

## 2019-11-08 ASSESSMENT — PAIN SCALES - GENERAL: PAINLEVEL: NO PAIN (0)

## 2019-11-08 NOTE — PROGRESS NOTES
"Subjective  Rosalio Pierre is a 63 year old male who presents for medication management.  He has a history of diabetes mellitus type 2 which is well controlled with metformin.  History of hypertension which is stable with ramipril.  History of hyperlipidemia which is stable with simvastatin.  He continues on aspirin for prophylaxis.  He had his eye exam yesterday and was told his eyes are normal.  He has no tingling in the feet.  No chest pains with exertion.  He gets up to urinate 1-3 times at night which is chronic, stable and unchanged.  No blood in the urine.    Problem List/PMH: reviewed in EMR, and made relevant updates today.  Medications: reviewed in EMR, and made relevant updates today.  Allergies: reviewed in EMR, and made relevant updates today.    Social Hx:  Social History     Tobacco Use     Smoking status: Never Smoker     Smokeless tobacco: Never Used   Substance Use Topics     Alcohol use: Yes     Comment: Alcoholic Drinks/day: Seldom     Drug use: Never     Social History     Patient does not qualify to have social determinant information on file (likely too young).   Social History Narrative    Lives on [facility] in [place]     I reviewed social history and made relevant updates today.    Family Hx:   Family History   Problem Relation Age of Onset     Parkinsonism Father 57        parkinsons,  81     Heart Disease Mother         CHF,  in 80s     No Known Problems Sister      No Known Problems Brother      No Known Problems Brother      Multiple Sclerosis Brother      Prostate Cancer No family hx of      Colon Cancer No family hx of      Lupus No family hx of      Sjogren's No family hx of      Thyroid Disease No family hx of        Objective  Vitals: reviewed in EMR.  /76 (BP Location: Right arm, Patient Position: Sitting, Cuff Size: Adult Large)   Pulse 60   Temp 97.3  F (36.3  C) (Tympanic)   Resp 14   Ht 1.765 m (5' 9.5\")   Wt 88.9 kg (196 lb)   SpO2 97%   BMI " 28.53 kg/m      Gen: Pleasant male, NAD.  HEENT: MMM, no OP erythema.   Neck: Supple, no JVD, no bruits.  CV: RRR no m/r/g.   Pulm: CTAB no w/r/r  Neuro: Grossly intact  Msk: No lower extremity edema.  Skin: No concerning lesions.  Psychiatric: Normal affect and insight. Does not appear anxious or depressed.    Foot Exam:  11/8/2019  Intact to monofilament bilaterally.  Skin intact without erythema.    Labs:  Lab Results   Component Value Date    HGB 13.2 (L) 11/06/2017    HCT 38.6 11/06/2017     11/06/2017    CHOL 204 (H) 11/08/2018    TRIG 163 (H) 11/08/2018    HDL 36 11/08/2018     11/08/2018    BUN 21 11/08/2018    CO2 26 11/08/2018       Glucose   Date Value Ref Range Status   11/08/2018 136 (H) 70 - 105 mg/dL Final   11/06/2017 145 (H) 70 - 105 mg/dL      Hemoglobin A1C   Date Value Ref Range Status   11/08/2018 7.1 (H) 4.0 - 6.0 % Final     Creatinine   Date Value Ref Range Status   11/08/2018 1.25 0.70 - 1.30 mg/dL Final   11/06/2017 1.06 0.70 - 1.30 mg/dL      LDL Cholesterol Calculated   Date Value Ref Range Status   11/08/2018 135 (H) <100 mg/dL Final     Comment:     Above desirable:  100-129 mg/dl  Borderline High:  130-159 mg/dL  High:             160-189 mg/dL  Very high:       >189 mg/dl               Assessment    ICD-10-CM    1. Type 2 diabetes mellitus without complication, without long-term current use of insulin (H) E11.9 Hemoglobin A1c     simvastatin (ZOCOR) 20 MG tablet     metFORMIN (GLUCOPHAGE) 500 MG tablet     blood glucose (NO BRAND SPECIFIED) lancets standard     blood glucose (NO BRAND SPECIFIED) test strip     blood glucose monitoring (NO BRAND SPECIFIED) meter device kit     Hemoglobin A1c   2. Mixed hyperlipidemia E78.2 Lipid Profile     simvastatin (ZOCOR) 20 MG tablet     Lipid Profile   3. Essential (primary) hypertension I10 Basic Metabolic Panel     ramipril (ALTACE) 10 MG capsule     Basic Metabolic Panel   4. Elevated prostate specific antigen (PSA) R97.20  Prostate Specific Antigen GH     Prostate Specific Antigen GH   5. Nocturia R35.1 Prostate Specific Antigen GH     Prostate Specific Antigen GH   6. Screening for thyroid disorder Z13.29 Thyrotropin GH     Thyrotropin GH     Orders Placed This Encounter   Procedures     Pneumococcal vaccine 23 valent PPSV23  (Pneumovax) [36563]     Hemoglobin A1c     Thyrotropin GH     Basic Metabolic Panel     Lipid Profile     Prostate Specific Antigen GH     Colon cancer screening is up-to-date.    Plan   -- Expected clinical course discussed   -- Medications and their side effects discussed  Patient Instructions     Aspects of Diabetes we can improve:  Hemoglobin A1c Lab Results   Component Value Date    A1C 7.1 11/08/2018    Goal range is under 8. Best is 6.5 to 7   Blood Pressure 128/76 Goal to keep less than 140/90   Tobacco  reports that he has never smoked. He has never used smokeless tobacco. Goal to abstain from tobacco   Aspirin yes Aspirin reduces risk of heart disease and stroke   ACE/ARB ramipril These medications reduce risk of kidney disease   Cholesterol simvastatin Statins reduce risk of heart disease and stroke   Eye Exam 11/19 Annual diabetic eye exam   Healthy weight Body mass index is 28.53 kg/m . Goal BMI under 30, best is under 25.      -- I'm trying to exercise daily (goal at least 20 min/day) with moderate aerobic activity   -- Eat healthy (resources from ADA at http://www.diabetes.org/)   -- I'm taking good care of my feet. Consider seeing the Podiatrist   -- Check blood sugars as directed, record in log book and bring to every appointment   -- Goal sugar before breakfast: under 140   -- Goal sugar 2 hours after supper: under 170   -- Next diabetes lab draw: 12 months   -- Next diabetes office visit: 12 months     -- Get the Tdap, and new shingles vaccine series from a nurse-only visit, pharmacy or [facility] ([place] RN).        Return in about 1 year (around 11/8/2020) for medication  management, diabetes.    Signed, Deven Canseco MD, FAAP, FACP  Internal Medicine & Pediatrics

## 2019-11-08 NOTE — NURSING NOTE
Patient presents to clinic for annual PX, medication review and to go over his diabetes.  Airam Peoples LPN ....................  11/8/2019   12:42 PM    No LMP for male patient.  Medication Reconciliation: complete    Airam Peoples LPN  11/8/2019 12:42 PM      Previous A1C is at goal of <8  Lab Results   Component Value Date    A1C 7.1 11/08/2018     Urine microalbumin:creatine: DUE  Foot exam DUE  Eye exam 11/7/19 [facility]    Tobacco User NO  Patient is on a daily aspirin  Patient is on a Statin.  Blood pressure today of:     BP Readings from Last 1 Encounters:   11/08/19 128/76      is at the goal of <139/89 for diabetics.    Airam Peoples LPN on 11/8/2019 at 12:43 PM

## 2019-11-08 NOTE — PATIENT INSTRUCTIONS
Aspects of Diabetes we can improve:  Hemoglobin A1c Lab Results   Component Value Date    A1C 7.1 11/08/2018    Goal range is under 8. Best is 6.5 to 7   Blood Pressure 128/76 Goal to keep less than 140/90   Tobacco  reports that he has never smoked. He has never used smokeless tobacco. Goal to abstain from tobacco   Aspirin yes Aspirin reduces risk of heart disease and stroke   ACE/ARB ramipril These medications reduce risk of kidney disease   Cholesterol simvastatin Statins reduce risk of heart disease and stroke   Eye Exam 11/19 Annual diabetic eye exam   Healthy weight Body mass index is 28.53 kg/m . Goal BMI under 30, best is under 25.      -- I'm trying to exercise daily (goal at least 20 min/day) with moderate aerobic activity   -- Eat healthy (resources from ADA at http://www.diabetes.org/)   -- I'm taking good care of my feet. Consider seeing the Podiatrist   -- Check blood sugars as directed, record in log book and bring to every appointment   -- Goal sugar before breakfast: under 140   -- Goal sugar 2 hours after supper: under 170   -- Next diabetes lab draw: 12 months   -- Next diabetes office visit: 12 months     -- Get the Tdap, and new shingles vaccine series from a nurse-only visit, pharmacy or [facility] ([facility] RN).

## 2019-11-08 NOTE — NURSING NOTE
Immunization Documentation    Prior to Immunization administration, verified patients identity using patient's name and date of birth. Please see IMMUNIZATIONS  and order for additional information.  Patient / Parent instructed to remain in clinic for 15 minutes and report any adverse reaction to staff immediately.    Was entire vial of medication used? Yes  Vial/Syringe: Single dose vial    Airam Peoples LPN  11/8/2019   1:20 PM

## 2019-11-11 ENCOUNTER — OFFICE VISIT (OUTPATIENT)
Dept: UROLOGY | Facility: OTHER | Age: 64
End: 2019-11-11
Attending: INTERNAL MEDICINE
Payer: COMMERCIAL

## 2019-11-11 VITALS
WEIGHT: 196 LBS | BODY MASS INDEX: 28.53 KG/M2 | SYSTOLIC BLOOD PRESSURE: 160 MMHG | DIASTOLIC BLOOD PRESSURE: 90 MMHG | RESPIRATION RATE: 14 BRPM | HEART RATE: 60 BPM

## 2019-11-11 DIAGNOSIS — R35.1 NOCTURIA: Primary | ICD-10-CM

## 2019-11-11 DIAGNOSIS — R97.20 ELEVATED PSA: ICD-10-CM

## 2019-11-11 LAB
ALBUMIN UR-MCNC: NEGATIVE MG/DL
APPEARANCE UR: CLEAR
BILIRUB UR QL STRIP: NEGATIVE
COLOR UR AUTO: YELLOW
GLUCOSE UR STRIP-MCNC: 100 MG/DL
HGB UR QL STRIP: NEGATIVE
KETONES UR STRIP-MCNC: NEGATIVE MG/DL
LEUKOCYTE ESTERASE UR QL STRIP: NEGATIVE
NITRATE UR QL: NEGATIVE
PH UR STRIP: 5.5 PH (ref 5–9)
RBC #/AREA URNS AUTO: NORMAL /HPF
SOURCE: ABNORMAL
SP GR UR STRIP: 1.02 (ref 1–1.03)
UROBILINOGEN UR STRIP-ACNC: 0.2 EU/DL (ref 0.2–1)
WBC #/AREA URNS AUTO: NORMAL /HPF

## 2019-11-11 PROCEDURE — 99204 OFFICE O/P NEW MOD 45 MIN: CPT | Mod: 25 | Performed by: UROLOGY

## 2019-11-11 PROCEDURE — 81001 URINALYSIS AUTO W/SCOPE: CPT | Mod: ZL | Performed by: UROLOGY

## 2019-11-11 PROCEDURE — 51798 US URINE CAPACITY MEASURE: CPT | Performed by: UROLOGY

## 2019-11-11 PROCEDURE — G0463 HOSPITAL OUTPT CLINIC VISIT: HCPCS | Performed by: UROLOGY

## 2019-11-11 ASSESSMENT — PAIN SCALES - GENERAL: PAINLEVEL: NO PAIN (0)

## 2019-11-11 NOTE — PROGRESS NOTES
Type of Visit  NPV    Chief Complaint  Elevated PSA  Nocturia  Weak stream    HPI  Mr. Pierre is a 63 year old male who presents with an elevated PSA.  He does not have a family history of prostate cancer.  The patient has not previously undergone prostate biopsy.  No associated worsening LUTS, dysuria or prostatitis at the time of the PSA.  The most recent PSA was collected 3 days ago.  Also with nocturia.  Typically twice a night.  Relatively low clinical bother  Denies hematuria and dysuria.      Past Medical History  He  has no past medical history on file.  Patient Active Problem List   Diagnosis     Type 2 diabetes mellitus without complication, without long-term current use of insulin (H)     Essential (primary) hypertension     Mixed hyperlipidemia       Past Surgical History  He  has a past surgical history that includes other surgical history and Colonoscopy.    Medications  He has a current medication list which includes the following prescription(s): aspirin, blood glucose, blood glucose, blood glucose monitoring, metformin, multi-vitamins, ramipril, and simvastatin.    Allergies  No Known Allergies    Social History  He  reports that he has never smoked. He has never used smokeless tobacco. He reports current alcohol use. He reports that he does not use drugs.  No drug abuse.    Family History  Family History   Problem Relation Age of Onset     Parkinsonism Father 57        parkinsons,  81     Heart Disease Mother         CHF,  in 80s     No Known Problems Sister      No Known Problems Brother      No Known Problems Brother      Multiple Sclerosis Brother      Prostate Cancer No family hx of      Colon Cancer No family hx of      Lupus No family hx of      Sjogren's No family hx of      Thyroid Disease No family hx of        Review of Systems  I personally reviewed the ROS with the patient.    Nursing Notes:   Marielena Weaver LPN  2019 10:49 AM  Signed  Pt presents to clinic for  elevated PSA and nocturia consult    Review of Systems:    Weight loss:    No     Recent fever/chills:  No   Night sweats:   No  Current skin rash:  No   Recent hair loss:  No  Heat intolerance:  No   Cold intolerance:  No  Chest pain:   No   Palpitations:   No  Shortness of breath:  No   Wheezing:   No  Constipation:    No   Diarrhea:   No   Nausea:   No   Vomiting:   No   Kidney/side pain:  No   Back pain:   No  Frequent headaches:  No   Dizziness:     No  Leg swelling:   No   Calf pain:    No    Parents, brothers or sisters with history of kidney cancer:   No  Parents, brothers or sisters with history of bladder cancer: No  Father or brother with history of prostate cancer:  No      Post-Void Residual  A post-void residual was measured by ultrasonic bladder scanner.  >144 mL      Physical Exam  Vitals:    11/11/19 1043   BP: (!) 160/90   BP Location: Right arm   Patient Position: Sitting   Cuff Size: Adult Regular   Pulse: 60   Resp: 14   Weight: 88.9 kg (196 lb)     Constitutional: No acute distress.  Alert and cooperative   Head: NCAT  Eyes: Conjunctivae normal  Cardiovascular: Regular rate.  Pulmonary/Chest: Respirations are even and non-labored bilaterally, no audible wheezing  Abdominal: Soft. No distension, tenderness, masses or guarding.   Neurological: A + O x 3.  Cranial Nerves II-XII grossly intact.  Extremities: CASEY x 4, Warm. No clubbing.  No cyanosis.    Skin: Pink, warm and dry.  No visible rashes noted.  Psychiatric:  Normal mood and affect  Back:  No left CVA tenderness.  No right CVA tenderness.  Genitourinary:  Nonpalpable bladder  MEAGAN: 40g, smooth, nontender, anodular, no induration    Labs  Results for orders placed or performed in visit on 11/11/19   UA reflex to Microscopic     Status: Abnormal   Result Value Ref Range    Color Urine Yellow     Appearance Urine Clear     Glucose Urine 100 (A) NEG^Negative mg/dL    Bilirubin Urine Negative NEG^Negative    Ketones Urine Negative NEG^Negative  mg/dL    Specific Gravity Urine 1.025 1.000 - 1.030    Blood Urine Negative NEG^Negative    pH Urine 5.5 5.0 - 9.0 pH    Protein Albumin Urine Negative NEG^Negative mg/dL    Urobilinogen Urine 0.2 0.2 - 1.0 EU/dL    Nitrite Urine Negative NEG^Negative    Leukocyte Esterase Urine Negative NEG^Negative    Source Midstream Urine      Results for DEV LOGAN (MRN [id number]) as of 11/11/2019 10:49   11/8/2019 13:27   Prostate Specific Antigen 4.662 (H)     Results for DEV LOGAN (MRN [id number]) as of 11/11/2019 10:49   11/8/2018 11:44   PSA Screen 3.225 (H)     Post-Void Residual  A post-void residual was measured by ultrasonic bladder scanner.  >144 mL today    Assessment  Mr. Logan is a 63 year old male who presents with elevated PSA.    Discussed the risks of biopsy leading to overdiagnosis and overtreatment.    We discussed options regarding the elevated PSA including continuing to check serial levels, Select MDx urine testing to further stratify his personal risk level, MRI of the prostate, prostate ultrasound.    We discussed the risks of biopsy possibly leading to over diagnosis and over treatment.    I explained to the patient that an elevated PSA is a marker of risk of prostate cancer and a prostate biopsy would be the next step in diagnosis.  I explained that sampling error can occur with any biopsy and there is a risk of potentially missing a cancer that may be present.    Risk at following PSA values  3.2  4.7  5%  7% Risk of high grade cancer detected on biopsy  17%  19% Risk of low grade cancer detected on biopsy  78%  75% Chance of benign biopsy    We discussed the above percentages.  I explained that the relative increase in risks of clinically significant prostate cancer at the 2 different PSA values is relatively low.  We both agreed to defer a biopsy at this time.    Plan  Follow-up in 6 months with a PSA.  Observation for nocturia at this time

## 2019-11-11 NOTE — NURSING NOTE
Pt presents to clinic for elevated PSA and nocturia consult    Review of Systems:    Weight loss:    No     Recent fever/chills:  No   Night sweats:   No  Current skin rash:  No   Recent hair loss:  No  Heat intolerance:  No   Cold intolerance:  No  Chest pain:   No   Palpitations:   No  Shortness of breath:  No   Wheezing:   No  Constipation:    No   Diarrhea:   No   Nausea:   No   Vomiting:   No   Kidney/side pain:  No   Back pain:   No  Frequent headaches:  No   Dizziness:     No  Leg swelling:   No   Calf pain:    No    Parents, brothers or sisters with history of kidney cancer:   No  Parents, brothers or sisters with history of bladder cancer: No  Father or brother with history of prostate cancer:  No      Post-Void Residual  A post-void residual was measured by ultrasonic bladder scanner.  >144 mL

## 2019-11-11 NOTE — TELEPHONE ENCOUNTER
Scheduling your visit for when you return should be fine.  Should urinary symptoms develop on the trip, I'd recommend seeing a physician.    Signed, Deven Canseco MD, FAAP, FACP  Internal Medicine & Pediatrics

## 2020-03-11 ENCOUNTER — HEALTH MAINTENANCE LETTER (OUTPATIENT)
Age: 65
End: 2020-03-11

## 2020-03-24 DIAGNOSIS — R97.20 ELEVATED PSA: Primary | ICD-10-CM

## 2020-05-27 ENCOUNTER — OFFICE VISIT (OUTPATIENT)
Dept: UROLOGY | Facility: OTHER | Age: 65
End: 2020-05-27
Attending: UROLOGY
Payer: MEDICARE

## 2020-05-27 VITALS
WEIGHT: 198.4 LBS | RESPIRATION RATE: 18 BRPM | BODY MASS INDEX: 28.88 KG/M2 | HEART RATE: 80 BPM | DIASTOLIC BLOOD PRESSURE: 88 MMHG | SYSTOLIC BLOOD PRESSURE: 122 MMHG

## 2020-05-27 DIAGNOSIS — R97.20 ELEVATED PSA: Primary | ICD-10-CM

## 2020-05-27 DIAGNOSIS — R97.20 ELEVATED PSA: ICD-10-CM

## 2020-05-27 LAB — PSA SERPL-MCNC: 2.8 NG/ML

## 2020-05-27 PROCEDURE — 99213 OFFICE O/P EST LOW 20 MIN: CPT | Performed by: UROLOGY

## 2020-05-27 PROCEDURE — 84153 ASSAY OF PSA TOTAL: CPT | Mod: ZL | Performed by: UROLOGY

## 2020-05-27 PROCEDURE — 36415 COLL VENOUS BLD VENIPUNCTURE: CPT | Mod: ZL | Performed by: UROLOGY

## 2020-05-27 PROCEDURE — G0463 HOSPITAL OUTPT CLINIC VISIT: HCPCS

## 2020-05-27 ASSESSMENT — PAIN SCALES - GENERAL: PAINLEVEL: NO PAIN (0)

## 2020-05-27 NOTE — PROGRESS NOTES
Type of Visit  EST    Chief Complaint  Elevated PSA  Nocturia  Weak stream    HPI  Mr. Pierre is a 63 year old male with a history of diabetes who follows up with an elevated PSA.  He does not have a family history of prostate cancer.  The patient has not previously undergone prostate biopsy.  No associated worsening LUTS, dysuria or prostatitis at the time of the PSA.  The most recent PSA was collected earlier today.  I initially saw the patient 6 months ago and his PSA at that time was mildly elevated for age.  At that time we elected to follow-up with a short-term interval PSA to assure no accelerated rise.    Following our visit from 6 months ago the patient is satisfied with observation of his low clinical bother urinary symptoms.  The patient agrees that the symptoms fluctuate on a daily basis and are somewhat associated with fluid intake.  He denies dysuria and gross hematuria.      Family History  Family History   Problem Relation Age of Onset     Parkinsonism Father 57        parkinsons,  81     Heart Disease Mother         CHF,  in 80s     No Known Problems Sister      No Known Problems Brother      No Known Problems Brother      Multiple Sclerosis Brother      Prostate Cancer No family hx of      Colon Cancer No family hx of      Lupus No family hx of      Sjogren's No family hx of      Thyroid Disease No family hx of        Review of Systems  I personally reviewed the ROS with the patient.    There are no exam notes on file for this visit.    Physical Exam  There were no vitals filed for this visit.  Constitutional: No acute distress.  Alert and cooperative   Head: NCAT  Eyes: Conjunctivae normal  Cardiovascular: Regular rate.  Pulmonary/Chest: Respirations are even and non-labored bilaterally, no audible wheezing  Abdominal: Soft. No distension, tenderness, masses or guarding.   Neurological: A + O x 3.  Cranial Nerves II-XII grossly intact.  Extremities: CASEY x 4, Warm. No clubbing.  No cyanosis.     Skin: Pink, warm and dry.  No visible rashes noted.  Psychiatric:  Normal mood and affect  Back:  No left CVA tenderness.  No right CVA tenderness.  Genitourinary:  Nonpalpable bladder  MEAGAN: 40g, smooth, nontender, anodular, no induration    Labs  Results for DEV LOGAN (MRN [id number]) as of 5/27/2020 10:30   11/8/2018 11:44 11/8/2019 13:27 5/27/2020 08:36   Prostate Specific Antigen 3.225 (H) 4.662 (H) 2.802     Post-Void Residual  A post-void residual was measured by ultrasonic bladder scanner.  >144 mL (previously recorded)    Assessment & Plan  Mr. Logan is a 63 year old male who follows up with elevated PSA.    Discussed the risks of biopsy leading to overdiagnosis and overtreatment.    We discussed options regarding the elevated PSA including continuing to check serial levels, Select MDx urine testing to further stratify his personal risk level, MRI of the prostate, prostate ultrasound.    We discussed the risks of biopsy possibly leading to over diagnosis and over treatment.    Follow-up with me as needed  Observation for nocturia at this time

## 2020-05-27 NOTE — NURSING NOTE
"Chief Complaint   Patient presents with     RECHECK     6 month Elevated PSA        Initial Pulse 80   Resp 18   Wt 90 kg (198 lb 6.4 oz)   BMI 28.88 kg/m   Estimated body mass index is 28.88 kg/m  as calculated from the following:    Height as of 11/8/19: 1.765 m (5' 9.5\").    Weight as of this encounter: 90 kg (198 lb 6.4 oz).  Medication Reconciliation: complete     Review of Systems:    Weight loss:    No     Recent fever/chills:  No   Night sweats:   No  Current skin rash:  No   Recent hair loss:  No  Heat intolerance:  No   Cold intolerance:  No  Chest pain:   No   Palpitations:   No  Shortness of breath:  No   Wheezing:   No  Constipation:    No   Diarrhea:   No   Nausea:   No   Vomiting:   No   Kidney/side pain:  No   Back pain:   No  Frequent headaches:  No   Dizziness:     No  Leg swelling:   No   Calf pain:    No        Yelena Blas RN      .  "

## 2020-11-09 ENCOUNTER — OFFICE VISIT (OUTPATIENT)
Dept: PEDIATRICS | Facility: OTHER | Age: 65
End: 2020-11-09
Attending: INTERNAL MEDICINE
Payer: MEDICARE

## 2020-11-09 VITALS
SYSTOLIC BLOOD PRESSURE: 132 MMHG | BODY MASS INDEX: 28.71 KG/M2 | WEIGHT: 193.8 LBS | HEART RATE: 64 BPM | TEMPERATURE: 97.7 F | RESPIRATION RATE: 18 BRPM | OXYGEN SATURATION: 98 % | HEIGHT: 69 IN | DIASTOLIC BLOOD PRESSURE: 76 MMHG

## 2020-11-09 DIAGNOSIS — E78.2 MIXED HYPERLIPIDEMIA: ICD-10-CM

## 2020-11-09 DIAGNOSIS — Z11.59 NEED FOR HEPATITIS C SCREENING TEST: ICD-10-CM

## 2020-11-09 DIAGNOSIS — Z11.4 ENCOUNTER FOR SCREENING FOR HIV: ICD-10-CM

## 2020-11-09 DIAGNOSIS — R97.20 ELEVATED PROSTATE SPECIFIC ANTIGEN (PSA): ICD-10-CM

## 2020-11-09 DIAGNOSIS — N40.1 BENIGN PROSTATIC HYPERPLASIA WITH URINARY FREQUENCY: ICD-10-CM

## 2020-11-09 DIAGNOSIS — Z12.11 SCREEN FOR COLON CANCER: ICD-10-CM

## 2020-11-09 DIAGNOSIS — E11.9 TYPE 2 DIABETES MELLITUS WITHOUT COMPLICATION, WITHOUT LONG-TERM CURRENT USE OF INSULIN (H): Primary | ICD-10-CM

## 2020-11-09 DIAGNOSIS — R35.0 BENIGN PROSTATIC HYPERPLASIA WITH URINARY FREQUENCY: ICD-10-CM

## 2020-11-09 DIAGNOSIS — I10 ESSENTIAL (PRIMARY) HYPERTENSION: ICD-10-CM

## 2020-11-09 LAB
ANION GAP SERPL CALCULATED.3IONS-SCNC: 8 MMOL/L (ref 3–14)
BUN SERPL-MCNC: 20 MG/DL (ref 7–25)
CALCIUM SERPL-MCNC: 9.7 MG/DL (ref 8.6–10.3)
CHLORIDE SERPL-SCNC: 105 MMOL/L (ref 98–107)
CHOLEST SERPL-MCNC: 129 MG/DL
CO2 SERPL-SCNC: 28 MMOL/L (ref 21–31)
CREAT SERPL-MCNC: 1.16 MG/DL (ref 0.7–1.3)
GFR SERPL CREATININE-BSD FRML MDRD: 63 ML/MIN/{1.73_M2}
GLUCOSE SERPL-MCNC: 169 MG/DL (ref 70–105)
HBA1C MFR BLD: 7.1 % (ref 4–6)
HDLC SERPL-MCNC: 39 MG/DL (ref 23–92)
LDLC SERPL CALC-MCNC: 63 MG/DL
NONHDLC SERPL-MCNC: 90 MG/DL
POTASSIUM SERPL-SCNC: 4.2 MMOL/L (ref 3.5–5.1)
PSA SERPL-MCNC: 3.5 NG/ML
SODIUM SERPL-SCNC: 141 MMOL/L (ref 134–144)
TRIGL SERPL-MCNC: 135 MG/DL

## 2020-11-09 PROCEDURE — 86803 HEPATITIS C AB TEST: CPT | Mod: ZL | Performed by: INTERNAL MEDICINE

## 2020-11-09 PROCEDURE — 36415 COLL VENOUS BLD VENIPUNCTURE: CPT | Mod: ZL | Performed by: INTERNAL MEDICINE

## 2020-11-09 PROCEDURE — 83036 HEMOGLOBIN GLYCOSYLATED A1C: CPT | Mod: ZL | Performed by: INTERNAL MEDICINE

## 2020-11-09 PROCEDURE — G0463 HOSPITAL OUTPT CLINIC VISIT: HCPCS

## 2020-11-09 PROCEDURE — 80048 BASIC METABOLIC PNL TOTAL CA: CPT | Mod: ZL | Performed by: INTERNAL MEDICINE

## 2020-11-09 PROCEDURE — 87389 HIV-1 AG W/HIV-1&-2 AB AG IA: CPT | Mod: ZL | Performed by: INTERNAL MEDICINE

## 2020-11-09 PROCEDURE — 84153 ASSAY OF PSA TOTAL: CPT | Mod: ZL | Performed by: INTERNAL MEDICINE

## 2020-11-09 PROCEDURE — 80061 LIPID PANEL: CPT | Mod: ZL | Performed by: INTERNAL MEDICINE

## 2020-11-09 PROCEDURE — 99214 OFFICE O/P EST MOD 30 MIN: CPT | Performed by: INTERNAL MEDICINE

## 2020-11-09 RX ORDER — SIMVASTATIN 20 MG
20 TABLET ORAL AT BEDTIME
Qty: 90 TABLET | Refills: 3 | Status: SHIPPED | OUTPATIENT
Start: 2020-11-09 | End: 2021-10-18

## 2020-11-09 RX ORDER — RAMIPRIL 10 MG/1
10 CAPSULE ORAL DAILY
Qty: 90 CAPSULE | Refills: 3 | Status: SHIPPED | OUTPATIENT
Start: 2020-11-09 | End: 2021-10-18

## 2020-11-09 ASSESSMENT — PAIN SCALES - GENERAL: PAINLEVEL: NO PAIN (0)

## 2020-11-09 ASSESSMENT — MIFFLIN-ST. JEOR: SCORE: 1650.48

## 2020-11-09 NOTE — NURSING NOTE
"Chief Complaint   Patient presents with     Recheck Medication     Patient is here for medication recheck     Initial /76 (BP Location: Right arm, Patient Position: Sitting, Cuff Size: Adult Regular)   Pulse 64   Temp 97.7  F (36.5  C) (Tympanic)   Resp 18   Ht 1.746 m (5' 8.75\")   Wt 87.9 kg (193 lb 12.8 oz)   SpO2 98%   BMI 28.83 kg/m   Estimated body mass index is 28.83 kg/m  as calculated from the following:    Height as of this encounter: 1.746 m (5' 8.75\").    Weight as of this encounter: 87.9 kg (193 lb 12.8 oz).  Medication Reconciliation: complete    Lianna Curtis MA  "

## 2020-11-09 NOTE — PROGRESS NOTES
"Subjective  Rosalio Pierre is a 64 year old male who presents for medication management.  He has a history of diabetes mellitus type 2 which is well controlled with metformin.  Morning blood sugars 140-150, during the day 120-130.  At bedtime is 120-130.  History of hypertension which is stable on ramipril.  History of hyperlipidemia which is stable on simvastatin.  He continues on aspirin for prophylaxis.  No chest pains with exertion.  He has had elevated PSA level in the past with ongoing slow urinary stream.  He saw Dr. Taylor.  No biopsy or medication was recommended at that time.  He feels like he has no significant change in his urinary symptoms.    Problem List/PMH: reviewed in EMR, and made relevant updates today.  Medications: reviewed in EMR, and made relevant updates today.  Allergies: reviewed in EMR, and made relevant updates today.    Social Hx:  Social History     Tobacco Use     Smoking status: Never Smoker     Smokeless tobacco: Never Used   Substance Use Topics     Alcohol use: Yes     Comment: Alcoholic Drinks/day: Seldom     Drug use: Never     Social History     Social History Narrative    Lives on [address] in [place]     I reviewed social history and made relevant updates today.    Family Hx:   Family History   Problem Relation Age of Onset     Parkinsonism Father 57        parkinsons,  81     Heart Disease Mother         CHF,  in 80s     No Known Problems Sister      No Known Problems Brother      No Known Problems Brother      Multiple Sclerosis Brother      Prostate Cancer No family hx of      Colon Cancer No family hx of      Lupus No family hx of      Sjogren's No family hx of      Thyroid Disease No family hx of        Objective  Vitals: reviewed in EMR.  /76 (BP Location: Right arm, Patient Position: Sitting, Cuff Size: Adult Regular)   Pulse 64   Temp 97.7  F (36.5  C) (Tympanic)   Resp 18   Ht 1.746 m (5' 8.75\")   Wt 87.9 kg (193 lb 12.8 oz)   SpO2 98%   " BMI 28.83 kg/m      Gen: Pleasant male, NAD.  HEENT: MMM, no OP erythema.   Neck: Supple, no JVD, no bruits.  CV: RRR no m/r/g.   Pulm: CTAB no w/r/r  GI: Soft, NT, ND. No HSM. No masses. Bowel sounds present.  Neuro: Grossly intact  Msk: No lower extremity edema.  Skin: No concerning lesions.  Psychiatric: Normal affect and insight. Does not appear anxious or depressed.    Labs:  Lab Results   Component Value Date    HGB 13.2 (L) 11/06/2017    HCT 38.6 11/06/2017     11/06/2017    CHOL 136 11/08/2019    TRIG 127 11/08/2019    HDL 39 11/08/2019     11/08/2019    BUN 23 11/08/2019    CO2 29 11/08/2019       Glucose   Date Value Ref Range Status   11/08/2019 129 (H) 70 - 105 mg/dL Final   11/08/2018 136 (H) 70 - 105 mg/dL Final     Hemoglobin A1C   Date Value Ref Range Status   11/08/2019 7.0 (H) 4.0 - 6.0 % Final   11/08/2018 7.1 (H) 4.0 - 6.0 % Final     Creatinine   Date Value Ref Range Status   11/08/2019 1.11 0.70 - 1.30 mg/dL Final   11/08/2018 1.25 0.70 - 1.30 mg/dL Final     LDL Cholesterol Calculated   Date Value Ref Range Status   11/08/2019 72 <100 mg/dL Final     Comment:     Desirable:       <100 mg/dl     Thyrotropin   Date Value Ref Range Status   11/08/2019 1.45 0.34 - 5.60 IU/mL Final               Assessment    ICD-10-CM    1. Type 2 diabetes mellitus without complication, without long-term current use of insulin (H)  E11.9 Basic Metabolic Panel     Hemoglobin A1c     blood glucose (NO BRAND SPECIFIED) lancets standard     blood glucose (NO BRAND SPECIFIED) test strip     blood glucose monitoring (NO BRAND SPECIFIED) meter device kit     metFORMIN (GLUCOPHAGE) 500 MG tablet     simvastatin (ZOCOR) 20 MG tablet     Hemoglobin A1c     Basic Metabolic Panel   2. Mixed hyperlipidemia  E78.2 Lipid Profile     Basic Metabolic Panel     simvastatin (ZOCOR) 20 MG tablet     Basic Metabolic Panel     Lipid Profile   3. Essential (primary) hypertension  I10 Basic Metabolic Panel     ramipril  (ALTACE) 10 MG capsule     Basic Metabolic Panel   4. Encounter for screening for HIV  Z11.4 HIV Antigen Antibody Combo     HIV Antigen Antibody Combo   5. Need for hepatitis C screening test  Z11.59 Hepatitis C Screen Reflex to HCV RNA Quant and Genotype     Hepatitis C Screen Reflex to HCV RNA Quant and Genotype   6. Screen for colon cancer  Z12.11 ABSTRACT COLOGUARD-NO CHARGE   7. Elevated prostate specific antigen (PSA)  R97.20 Prostate Specific Antigen GH     Prostate Specific Antigen GH   8. Benign prostatic hyperplasia with urinary frequency  N40.1     R35.0      Orders Placed This Encounter   Procedures     Lipid Profile     Basic Metabolic Panel     Hemoglobin A1c     HIV Antigen Antibody Combo     Hepatitis C Screen Reflex to HCV RNA Quant and Genotype     ABSTRACT COLOGUARD-NO CHARGE     Prostate Specific Antigen GH       Medications are reviewed, renewed.  Screening and surveillance lab work is obtained as outlined above.    Plan   -- Expected clinical course discussed   -- Medications and their side effects discussed  Patient Instructions     Aspects of Diabetes we can improve:  Hemoglobin A1c Lab Results   Component Value Date    A1C 7.0 11/08/2019    A1C 7.1 11/08/2018    Goal range is under 8. Best is 6.5 to 7   Blood Pressure 132/76 Goal to keep less than 140/90   Tobacco  reports that he has never smoked. He has never used smokeless tobacco. Goal to abstain from tobacco   Aspirin yes Aspirin reduces risk of heart disease and stroke   ACE/ARB ramipril These medications reduce risk of kidney disease   Cholesterol simvastatin Statins reduce risk of heart disease and stroke   Eye Exam annual Annual diabetic eye exam   Healthy weight Body mass index is 28.83 kg/m . Goal BMI under 30, best is under 25.      -- I'm trying to exercise daily (goal at least 20 min/day) with moderate aerobic activity   -- Eat healthy (resources from ADA at http://www.diabetes.org/)   -- I'm taking good care of my feet.  Consider seeing the Podiatrist   -- Check blood sugars as directed, record in log book and bring to every appointment   -- Goal sugar before breakfast: under 140   -- Goal sugar 2 hours after supper: under 170   -- Next diabetes lab draw: 12 months   -- Next diabetes office visit: 12 months       -- Get the new shingles vaccine series from a nurse-only visit, pharmacy or [facility] ([facility] RN).        Return in about 1 year (around 11/9/2021) for medication management.    Signed, Deven Canseco MD, FAAP, FACP  Internal Medicine & Pediatrics

## 2020-11-09 NOTE — PATIENT INSTRUCTIONS
Aspects of Diabetes we can improve:  Hemoglobin A1c Lab Results   Component Value Date    A1C 7.0 11/08/2019    A1C 7.1 11/08/2018    Goal range is under 8. Best is 6.5 to 7   Blood Pressure 132/76 Goal to keep less than 140/90   Tobacco  reports that he has never smoked. He has never used smokeless tobacco. Goal to abstain from tobacco   Aspirin yes Aspirin reduces risk of heart disease and stroke   ACE/ARB ramipril These medications reduce risk of kidney disease   Cholesterol simvastatin Statins reduce risk of heart disease and stroke   Eye Exam annual Annual diabetic eye exam   Healthy weight Body mass index is 28.83 kg/m . Goal BMI under 30, best is under 25.      -- I'm trying to exercise daily (goal at least 20 min/day) with moderate aerobic activity   -- Eat healthy (resources from ADA at http://www.diabetes.org/)   -- I'm taking good care of my feet. Consider seeing the Podiatrist   -- Check blood sugars as directed, record in log book and bring to every appointment   -- Goal sugar before breakfast: under 140   -- Goal sugar 2 hours after supper: under 170   -- Next diabetes lab draw: 12 months   -- Next diabetes office visit: 12 months       -- Get the new shingles vaccine series from a nurse-only visit, pharmacy or [facility] ([facility] RN).

## 2020-11-09 NOTE — PROGRESS NOTES
Previous A1C is at goal of <8  Lab Results   Component Value Date    A1C 7.0 11/08/2019    A1C 7.1 11/08/2018     Urine microalbumin:creatine: N/A  Foot exam DUE   Eye exam DUE  Tobacco User No   Patient is on a daily aspirin  Patient is on a Statin.  Blood pressure today of:     BP Readings from Last 1 Encounters:   11/09/20 132/76      is at the goal of <139/89 for diabetics.    Lianna Curtis MA on 11/9/2020 at 8:09 AM

## 2020-11-10 LAB
HCV AB SERPL QL IA: NONREACTIVE
HIV 1+2 AB+HIV1 P24 AG SERPL QL IA: NONREACTIVE

## 2020-11-19 ENCOUNTER — TRANSFERRED RECORDS (OUTPATIENT)
Dept: HEALTH INFORMATION MANAGEMENT | Facility: OTHER | Age: 65
End: 2020-11-19

## 2020-11-19 LAB — RETINOPATHY: NEGATIVE

## 2021-04-25 ENCOUNTER — HEALTH MAINTENANCE LETTER (OUTPATIENT)
Age: 66
End: 2021-04-25

## 2021-06-19 ENCOUNTER — HEALTH MAINTENANCE LETTER (OUTPATIENT)
Age: 66
End: 2021-06-19

## 2021-10-09 ENCOUNTER — HEALTH MAINTENANCE LETTER (OUTPATIENT)
Age: 66
End: 2021-10-09

## 2021-10-18 ENCOUNTER — OFFICE VISIT (OUTPATIENT)
Dept: PEDIATRICS | Facility: OTHER | Age: 66
End: 2021-10-18
Attending: INTERNAL MEDICINE
Payer: MEDICARE

## 2021-10-18 VITALS
RESPIRATION RATE: 16 BRPM | HEIGHT: 69 IN | SYSTOLIC BLOOD PRESSURE: 120 MMHG | DIASTOLIC BLOOD PRESSURE: 80 MMHG | HEART RATE: 66 BPM | WEIGHT: 185 LBS | BODY MASS INDEX: 27.4 KG/M2

## 2021-10-18 DIAGNOSIS — Z79.82 LONG TERM (CURRENT) USE OF ASPIRIN: ICD-10-CM

## 2021-10-18 DIAGNOSIS — R97.20 ELEVATED PROSTATE SPECIFIC ANTIGEN (PSA): ICD-10-CM

## 2021-10-18 DIAGNOSIS — Z13.6 SCREENING FOR AAA (ABDOMINAL AORTIC ANEURYSM): ICD-10-CM

## 2021-10-18 DIAGNOSIS — E11.9 TYPE 2 DIABETES MELLITUS WITHOUT COMPLICATION, WITHOUT LONG-TERM CURRENT USE OF INSULIN (H): ICD-10-CM

## 2021-10-18 DIAGNOSIS — E78.2 MIXED HYPERLIPIDEMIA: ICD-10-CM

## 2021-10-18 DIAGNOSIS — R35.0 BENIGN PROSTATIC HYPERPLASIA WITH URINARY FREQUENCY: ICD-10-CM

## 2021-10-18 DIAGNOSIS — I10 ESSENTIAL (PRIMARY) HYPERTENSION: ICD-10-CM

## 2021-10-18 DIAGNOSIS — Z23 NEED FOR VACCINATION: ICD-10-CM

## 2021-10-18 DIAGNOSIS — Z00.00 ENCOUNTER FOR MEDICARE ANNUAL WELLNESS EXAM: Primary | ICD-10-CM

## 2021-10-18 DIAGNOSIS — N40.1 BENIGN PROSTATIC HYPERPLASIA WITH URINARY FREQUENCY: ICD-10-CM

## 2021-10-18 LAB
ANION GAP SERPL CALCULATED.3IONS-SCNC: 8 MMOL/L (ref 3–14)
BUN SERPL-MCNC: 25 MG/DL (ref 7–25)
CALCIUM SERPL-MCNC: 10 MG/DL (ref 8.6–10.3)
CHLORIDE BLD-SCNC: 105 MMOL/L (ref 98–107)
CHOLEST SERPL-MCNC: 141 MG/DL
CO2 SERPL-SCNC: 27 MMOL/L (ref 21–31)
CREAT SERPL-MCNC: 1.2 MG/DL (ref 0.7–1.3)
DEPRECATED CALCIDIOL+CALCIFEROL SERPL-MC: 42 UG/L (ref 30–100)
FASTING STATUS PATIENT QL REPORTED: YES
GFR SERPL CREATININE-BSD FRML MDRD: 63 ML/MIN/1.73M2
GLUCOSE BLD-MCNC: 146 MG/DL (ref 70–105)
HBA1C MFR BLD: 7.3 % (ref 4–6.2)
HDLC SERPL-MCNC: 38 MG/DL (ref 23–92)
LDLC SERPL CALC-MCNC: 76 MG/DL
NONHDLC SERPL-MCNC: 103 MG/DL
POTASSIUM BLD-SCNC: 4.9 MMOL/L (ref 3.5–5.1)
PSA SERPL-MCNC: 2.8 UG/L (ref 0–4)
SODIUM SERPL-SCNC: 140 MMOL/L (ref 134–144)
TRIGL SERPL-MCNC: 133 MG/DL
VIT B12 SERPL-MCNC: 192 PG/ML (ref 180–914)

## 2021-10-18 PROCEDURE — 82607 VITAMIN B-12: CPT | Mod: ZL | Performed by: INTERNAL MEDICINE

## 2021-10-18 PROCEDURE — 90662 IIV NO PRSV INCREASED AG IM: CPT

## 2021-10-18 PROCEDURE — 80061 LIPID PANEL: CPT | Mod: ZL | Performed by: INTERNAL MEDICINE

## 2021-10-18 PROCEDURE — G0439 PPPS, SUBSEQ VISIT: HCPCS | Performed by: INTERNAL MEDICINE

## 2021-10-18 PROCEDURE — 83036 HEMOGLOBIN GLYCOSYLATED A1C: CPT | Mod: ZL | Performed by: INTERNAL MEDICINE

## 2021-10-18 PROCEDURE — 36415 COLL VENOUS BLD VENIPUNCTURE: CPT | Mod: ZL | Performed by: INTERNAL MEDICINE

## 2021-10-18 PROCEDURE — G0009 ADMIN PNEUMOCOCCAL VACCINE: HCPCS

## 2021-10-18 PROCEDURE — G0008 ADMIN INFLUENZA VIRUS VAC: HCPCS

## 2021-10-18 PROCEDURE — 84153 ASSAY OF PSA TOTAL: CPT | Mod: ZL | Performed by: INTERNAL MEDICINE

## 2021-10-18 PROCEDURE — 80048 BASIC METABOLIC PNL TOTAL CA: CPT | Mod: ZL | Performed by: INTERNAL MEDICINE

## 2021-10-18 PROCEDURE — 82306 VITAMIN D 25 HYDROXY: CPT | Mod: ZL | Performed by: INTERNAL MEDICINE

## 2021-10-18 RX ORDER — ZOSTER VACCINE RECOMBINANT, ADJUVANTED 50 MCG/0.5
1 KIT INTRAMUSCULAR ONCE
Qty: 0.5 ML | Refills: 1 | Status: SHIPPED | OUTPATIENT
Start: 2021-10-18 | End: 2021-10-18

## 2021-10-18 RX ORDER — SIMVASTATIN 20 MG
20 TABLET ORAL AT BEDTIME
Qty: 90 TABLET | Refills: 3 | Status: SHIPPED | OUTPATIENT
Start: 2021-10-18 | End: 2022-08-19

## 2021-10-18 RX ORDER — RAMIPRIL 10 MG/1
10 CAPSULE ORAL DAILY
Qty: 90 CAPSULE | Refills: 3 | Status: SHIPPED | OUTPATIENT
Start: 2021-10-18 | End: 2022-08-19

## 2021-10-18 ASSESSMENT — PAIN SCALES - GENERAL: PAINLEVEL: NO PAIN (0)

## 2021-10-18 ASSESSMENT — MIFFLIN-ST. JEOR: SCORE: 1605.56

## 2021-10-18 NOTE — NURSING NOTE
"Patient presents for annual medicare physical.  Chief Complaint   Patient presents with     Medicare Visit       Initial There were no vitals taken for this visit. Estimated body mass index is 28.83 kg/m  as calculated from the following:    Height as of 11/9/20: 1.746 m (5' 8.75\").    Weight as of 11/9/20: 87.9 kg (193 lb 12.8 oz).  Medication Reconciliation: complete    Deepti Mccarthy LPN  "

## 2021-10-18 NOTE — PROGRESS NOTES
SUBJECTIVE:   Rosalio Pierre is a 65 year old male who presents for Preventive Visit.      Patient has been advised of split billing requirements and indicates understanding: Yes   Are you in the first 12 months of your Medicare coverage?  No    HPI  Do you feel safe in your environment? Yes    Have you ever done Advance Care Planning? (For example, a Health Directive, POLST, or a discussion with a medical provider or your loved ones about your wishes): No, advance care planning information given to patient to review.  Patient declined advance care planning discussion at this time.       Fall risk  Fallen 2 or more times in the past year?: No  Any fall with injury in the past year?: No    Cognitive Screening   1) Repeat 3 items (Leader, Season, Table)    2) Clock draw: normalNORMAL  3) 3 item recall: Recalls 3 objects  Results: 3 items recalled: COGNITIVE IMPAIRMENT LESS LIKELY    Mini-CogTM Copyright S Erasmo. Licensed by the author for use in [facility]; reprinted with permission (hannah@[facility]). All rights reserved.      Do you have sleep apnea, excessive snoring or daytime drowsiness?: no    Reviewed and updated as needed this visit by clinical staff  Tobacco  Allergies  Meds  Problems     Soc Hx        Reviewed and updated as needed this visit by Provider   Allergies  Meds  Problems            Social History     Tobacco Use     Smoking status: Never Smoker     Smokeless tobacco: Never Used   Substance Use Topics     Alcohol use: Yes     Comment: Alcoholic Drinks/day: Seldom     If you drink alcohol do you typically have >3 drinks per day or >7 drinks per week? No    Alcohol Use 10/18/2021   Prescreen: >3 drinks/day or >7 drinks/week? No               Current providers sharing in care for this patient include:   Patient Care Team:  Deven Canseco MD as PCP - General (Pediatrics)  Deven Canseco MD as Assigned PCP  Sincere Taylor MD as Assigned Surgical Provider    The following  "health maintenance items are reviewed in Epic and correct as of today:  Health Maintenance Due   Topic Date Due     DTAP/TDAP/TD IMMUNIZATION (1 - Tdap) Never done     ZOSTER IMMUNIZATION (1 of 2) Never done     AORTIC ANEURYSM SCREENING (SYSTEM ASSIGNED)  Never done     DIABETIC FOOT EXAM  11/09/2021     FALL RISK ASSESSMENT  11/09/2021     Labs reviewed in EPIC          Review of Systems  Constitutional, HEENT, cardiovascular, pulmonary, gi and gu systems are negative, except as otherwise noted.    OBJECTIVE:   /80 (BP Location: Right arm, Patient Position: Sitting, Cuff Size: Adult Regular)   Pulse 66   Resp 16   Ht 1.746 m (5' 8.75\")   Wt 83.9 kg (185 lb)   BMI 27.52 kg/m   Estimated body mass index is 27.52 kg/m  as calculated from the following:    Height as of this encounter: 1.746 m (5' 8.75\").    Weight as of this encounter: 83.9 kg (185 lb).  Physical Exam  HEENT: No carotid bruits  Cardiovascular: Regular rate and rhythm, no murmurs  Pulmonary: Clear  MSK: No edema    Diagnostic Test Results:  Labs reviewed in Epic    ASSESSMENT / PLAN:       ICD-10-CM    1. Encounter for Medicare annual wellness exam  Z00.00    2. Type 2 diabetes mellitus without complication, without long-term current use of insulin (H)  E11.9 metFORMIN (GLUCOPHAGE) 500 MG tablet     simvastatin (ZOCOR) 20 MG tablet     blood glucose monitoring (NO BRAND SPECIFIED) meter device kit     blood glucose (NO BRAND SPECIFIED) test strip     blood glucose (NO BRAND SPECIFIED) lancets standard     Basic metabolic panel     Hemoglobin A1c     Vitamin B12     Vitamin D Deficiency     Basic metabolic panel     Hemoglobin A1c     Vitamin B12     Vitamin D Deficiency   3. Essential (primary) hypertension  I10 ramipril (ALTACE) 10 MG capsule     Basic metabolic panel     Basic metabolic panel   4. Mixed hyperlipidemia  E78.2 simvastatin (ZOCOR) 20 MG tablet     Lipid Profile     Lipid Profile   5. Need for vaccination  Z23 INFLUENZA, QUAD, " "HIGH DOSE, PF, 65YR + (FLUZONE HD)     Pneumococcal vaccine 13 valent PCV13 IM (Prevnar) [[id number]]     Tdap, tetanus-diptheria-acell pertussis, (BOOSTRIX) 5-2.5-18.5 LF-MCG/0.5 SUSP injection     zoster vaccine recombinant adjuvanted (SHINGRIX) injection   6. Benign prostatic hyperplasia with urinary frequency  N40.1 PSA tumor marker    R35.0 PSA tumor marker   7. Elevated prostate specific antigen (PSA)  R97.20 PSA tumor marker     PSA tumor marker   8. Screening for AAA (abdominal aortic aneurysm)  Z13.6 US Aorta Medicare AAA Screening   9. Long term (current) use of aspirin   Z79.82 Vitamin D Deficiency     Vitamin D Deficiency       Patient has been advised of split billing requirements and indicates understanding: Yes  COUNSELING:  Reviewed preventive health counseling, as reflected in patient instructions    Estimated body mass index is 27.52 kg/m  as calculated from the following:    Height as of this encounter: 1.746 m (5' 8.75\").    Weight as of this encounter: 83.9 kg (185 lb).    Weight management plan: Discussed healthy diet and exercise guidelines    He reports that he has never smoked. He has never used smokeless tobacco.      Appropriate preventive services were discussed with this patient, including applicable screening as appropriate for cardiovascular disease, diabetes, osteopenia/osteoporosis, and glaucoma.  As appropriate for age/gender, discussed screening for colorectal cancer, prostate cancer, breast cancer, and cervical cancer. Checklist reviewing preventive services available has been given to the patient.    Reviewed patients plan of care and provided an AVS. The Basic Care Plan (routine screening as documented in Health Maintenance) for Rosalio meets the Care Plan requirement. This Care Plan has been established and reviewed with the Patient.    Counseling Resources:  ATP IV Guidelines  Pooled Cohorts Equation Calculator  Breast Cancer Risk Calculator  Breast Cancer: Medication to Reduce " Risk  FRAX Risk Assessment  ICSI Preventive Guidelines  Dietary Guidelines for Americans, 2010  USDA's MyPlate  ASA Prophylaxis  Lung CA Screening    Deven Canseco MD  [facility]    Identified Health Risks:

## 2021-10-18 NOTE — PATIENT INSTRUCTIONS
-- Flu and pneumonia 13 shots today   -- Tdap and shingrix at your pharmacy   -- Get the new shingles vaccine series from a nurse-only visit, pharmacy or [facility] ([facility] RN).        Patient Education   Personalized Prevention Plan  You are due for the preventive services outlined below.  Your care team is available to assist you in scheduling these services.  If you have already completed any of these items, please share that information with your care team to update in your medical record.  Health Maintenance Due   Topic Date Due     Diptheria Tetanus Pertussis (DTAP/TDAP/TD) Vaccine (1 - Tdap) Never done     Zoster (Shingles) Vaccine (1 of 2) Never done     AORTIC ANEURYSM SCREENING (SYSTEM ASSIGNED)  Never done     A1C Lab  05/09/2021     Flu Vaccine (1) 09/01/2021     Basic Metabolic Panel  11/09/2021     Cholesterol Lab  11/09/2021     Diabetic Foot Exam  11/09/2021     FALL RISK ASSESSMENT  11/09/2021

## 2021-11-02 ENCOUNTER — HOSPITAL ENCOUNTER (OUTPATIENT)
Dept: ULTRASOUND IMAGING | Facility: OTHER | Age: 66
Discharge: HOME | End: 2021-11-02
Attending: INTERNAL MEDICINE | Admitting: INTERNAL MEDICINE
Payer: MEDICARE

## 2021-11-02 DIAGNOSIS — Z13.6 SCREENING FOR AAA (ABDOMINAL AORTIC ANEURYSM): ICD-10-CM

## 2021-11-02 PROCEDURE — 76706 US ABDL AORTA SCREEN AAA: CPT

## 2022-05-21 ENCOUNTER — HEALTH MAINTENANCE LETTER (OUTPATIENT)
Age: 67
End: 2022-05-21

## 2022-06-01 ENCOUNTER — TRANSFERRED RECORDS (OUTPATIENT)
Dept: HEALTH INFORMATION MANAGEMENT | Facility: CLINIC | Age: 67
End: 2022-06-01

## 2022-06-01 LAB — RETINOPATHY: NORMAL

## 2022-08-12 ASSESSMENT — ENCOUNTER SYMPTOMS
FREQUENCY: 1
ARTHRALGIAS: 1

## 2022-08-12 ASSESSMENT — ACTIVITIES OF DAILY LIVING (ADL): CURRENT_FUNCTION: NO ASSISTANCE NEEDED

## 2022-08-19 ENCOUNTER — OFFICE VISIT (OUTPATIENT)
Dept: PEDIATRICS | Facility: OTHER | Age: 67
End: 2022-08-19
Attending: INTERNAL MEDICINE
Payer: COMMERCIAL

## 2022-08-19 ENCOUNTER — TELEPHONE (OUTPATIENT)
Dept: PEDIATRICS | Facility: OTHER | Age: 67
End: 2022-08-19

## 2022-08-19 VITALS
BODY MASS INDEX: 27.25 KG/M2 | HEIGHT: 69 IN | OXYGEN SATURATION: 97 % | HEART RATE: 90 BPM | DIASTOLIC BLOOD PRESSURE: 88 MMHG | TEMPERATURE: 98.7 F | SYSTOLIC BLOOD PRESSURE: 138 MMHG | WEIGHT: 184 LBS | RESPIRATION RATE: 22 BRPM

## 2022-08-19 DIAGNOSIS — R35.0 BENIGN PROSTATIC HYPERPLASIA WITH URINARY FREQUENCY: ICD-10-CM

## 2022-08-19 DIAGNOSIS — M25.511 ACUTE PAIN OF BOTH SHOULDERS: ICD-10-CM

## 2022-08-19 DIAGNOSIS — M25.551 HIP PAIN, BILATERAL: ICD-10-CM

## 2022-08-19 DIAGNOSIS — M25.512 ACUTE PAIN OF BOTH SHOULDERS: ICD-10-CM

## 2022-08-19 DIAGNOSIS — D64.9 ANEMIA, NORMOCYTIC NORMOCHROMIC: ICD-10-CM

## 2022-08-19 DIAGNOSIS — E11.9 TYPE 2 DIABETES MELLITUS WITHOUT COMPLICATION, WITHOUT LONG-TERM CURRENT USE OF INSULIN (H): ICD-10-CM

## 2022-08-19 DIAGNOSIS — M26.622 LEFT-SIDED TEMPOROMANDIBULAR JOINT PAIN-DYSFUNCTION SYNDROME: ICD-10-CM

## 2022-08-19 DIAGNOSIS — Z13.0 SCREENING, ANEMIA, DEFICIENCY, IRON: ICD-10-CM

## 2022-08-19 DIAGNOSIS — E61.1 IRON DEFICIENCY: ICD-10-CM

## 2022-08-19 DIAGNOSIS — Z23 NEED FOR VACCINATION: ICD-10-CM

## 2022-08-19 DIAGNOSIS — N40.1 BENIGN PROSTATIC HYPERPLASIA WITH URINARY FREQUENCY: ICD-10-CM

## 2022-08-19 DIAGNOSIS — T14.8XXA SLIVER: ICD-10-CM

## 2022-08-19 DIAGNOSIS — E53.8 VITAMIN B12 DEFICIENCY (NON ANEMIC): ICD-10-CM

## 2022-08-19 DIAGNOSIS — I10 ESSENTIAL (PRIMARY) HYPERTENSION: ICD-10-CM

## 2022-08-19 DIAGNOSIS — M35.3 POLYMYALGIA RHEUMATICA (H): Primary | ICD-10-CM

## 2022-08-19 DIAGNOSIS — E78.2 MIXED HYPERLIPIDEMIA: ICD-10-CM

## 2022-08-19 DIAGNOSIS — M25.552 HIP PAIN, BILATERAL: ICD-10-CM

## 2022-08-19 DIAGNOSIS — Z00.00 ENCOUNTER FOR MEDICARE ANNUAL WELLNESS EXAM: Primary | ICD-10-CM

## 2022-08-19 DIAGNOSIS — R97.20 ELEVATED PROSTATE SPECIFIC ANTIGEN (PSA): ICD-10-CM

## 2022-08-19 LAB
ANION GAP SERPL CALCULATED.3IONS-SCNC: 10 MMOL/L (ref 3–14)
BUN SERPL-MCNC: 23 MG/DL (ref 7–25)
CALCIUM SERPL-MCNC: 9.7 MG/DL (ref 8.6–10.3)
CHLORIDE BLD-SCNC: 102 MMOL/L (ref 98–107)
CHOLEST SERPL-MCNC: 115 MG/DL
CO2 SERPL-SCNC: 26 MMOL/L (ref 21–31)
CREAT SERPL-MCNC: 1.05 MG/DL (ref 0.7–1.3)
CRP SERPL-MCNC: 64 MG/L
ERYTHROCYTE [DISTWIDTH] IN BLOOD BY AUTOMATED COUNT: 13.2 % (ref 10–15)
ERYTHROCYTE [SEDIMENTATION RATE] IN BLOOD BY WESTERGREN METHOD: 87 MM/HR (ref 0–20)
FASTING STATUS PATIENT QL REPORTED: YES
GFR SERPL CREATININE-BSD FRML MDRD: 78 ML/MIN/1.73M2
GLUCOSE BLD-MCNC: 173 MG/DL (ref 70–105)
HBA1C MFR BLD: 8.3 % (ref 4–6.2)
HCT VFR BLD AUTO: 31.2 % (ref 40–53)
HDLC SERPL-MCNC: 40 MG/DL (ref 23–92)
HGB BLD-MCNC: 10.5 G/DL (ref 13.3–17.7)
IRON SATN MFR SERPL: 8 % (ref 20–55)
IRON SERPL-MCNC: 29 UG/DL (ref 50–212)
LDLC SERPL CALC-MCNC: 55 MG/DL
MCH RBC QN AUTO: 29.2 PG (ref 26.5–33)
MCHC RBC AUTO-ENTMCNC: 33.7 G/DL (ref 31.5–36.5)
MCV RBC AUTO: 87 FL (ref 78–100)
NONHDLC SERPL-MCNC: 75 MG/DL
PLATELET # BLD AUTO: 276 10E3/UL (ref 150–450)
POTASSIUM BLD-SCNC: 4.5 MMOL/L (ref 3.5–5.1)
PSA SERPL-MCNC: 5.17 UG/L (ref 0–4)
RBC # BLD AUTO: 3.6 10E6/UL (ref 4.4–5.9)
SODIUM SERPL-SCNC: 138 MMOL/L (ref 134–144)
TIBC SERPL-MCNC: 361.2 UG/DL (ref 245–400)
TRANSFERRIN SERPL-MCNC: 258 MG/DL (ref 203–362)
TRIGL SERPL-MCNC: 100 MG/DL
UIBC (UNSATURATED): 332.2 MG/DL
VIT B12 SERPL-MCNC: 151 PG/ML (ref 180–914)
WBC # BLD AUTO: 7.4 10E3/UL (ref 4–11)

## 2022-08-19 PROCEDURE — 85027 COMPLETE CBC AUTOMATED: CPT | Mod: ZL | Performed by: INTERNAL MEDICINE

## 2022-08-19 PROCEDURE — 80048 BASIC METABOLIC PNL TOTAL CA: CPT | Mod: ZL | Performed by: INTERNAL MEDICINE

## 2022-08-19 PROCEDURE — G0439 PPPS, SUBSEQ VISIT: HCPCS | Performed by: INTERNAL MEDICINE

## 2022-08-19 PROCEDURE — 36415 COLL VENOUS BLD VENIPUNCTURE: CPT | Mod: ZL | Performed by: INTERNAL MEDICINE

## 2022-08-19 PROCEDURE — 83550 IRON BINDING TEST: CPT | Mod: ZL | Performed by: INTERNAL MEDICINE

## 2022-08-19 PROCEDURE — 82607 VITAMIN B-12: CPT | Mod: ZL | Performed by: INTERNAL MEDICINE

## 2022-08-19 PROCEDURE — 80061 LIPID PANEL: CPT | Mod: ZL | Performed by: INTERNAL MEDICINE

## 2022-08-19 PROCEDURE — 84153 ASSAY OF PSA TOTAL: CPT | Mod: ZL | Performed by: INTERNAL MEDICINE

## 2022-08-19 PROCEDURE — G0463 HOSPITAL OUTPT CLINIC VISIT: HCPCS

## 2022-08-19 PROCEDURE — 86140 C-REACTIVE PROTEIN: CPT | Mod: ZL | Performed by: INTERNAL MEDICINE

## 2022-08-19 PROCEDURE — 83036 HEMOGLOBIN GLYCOSYLATED A1C: CPT | Mod: ZL | Performed by: INTERNAL MEDICINE

## 2022-08-19 PROCEDURE — 85652 RBC SED RATE AUTOMATED: CPT | Mod: ZL | Performed by: INTERNAL MEDICINE

## 2022-08-19 RX ORDER — ZOSTER VACCINE RECOMBINANT, ADJUVANTED 50 MCG/0.5
1 KIT INTRAMUSCULAR ONCE
Qty: 0.5 ML | Refills: 1 | Status: SHIPPED | OUTPATIENT
Start: 2022-08-19 | End: 2022-08-19

## 2022-08-19 RX ORDER — RAMIPRIL 10 MG/1
10 CAPSULE ORAL DAILY
Qty: 90 CAPSULE | Refills: 3 | Status: SHIPPED | OUTPATIENT
Start: 2022-08-19 | End: 2023-04-17

## 2022-08-19 RX ORDER — GLIPIZIDE 5 MG/1
5 TABLET ORAL
Qty: 180 TABLET | Refills: 1 | Status: SHIPPED | OUTPATIENT
Start: 2022-08-19 | End: 2022-10-25

## 2022-08-19 RX ORDER — LANOLIN ALCOHOL/MO/W.PET/CERES
1000 CREAM (GRAM) TOPICAL DAILY
Qty: 90 TABLET | Refills: 4 | Status: SHIPPED | OUTPATIENT
Start: 2022-08-19 | End: 2023-09-22

## 2022-08-19 RX ORDER — PREDNISONE 5 MG/1
TABLET ORAL
Qty: 150 TABLET | Refills: 0 | Status: SHIPPED | OUTPATIENT
Start: 2022-08-19 | End: 2022-09-22

## 2022-08-19 RX ORDER — FERROUS SULFATE 325(65) MG
325 TABLET ORAL
Qty: 90 TABLET | Refills: 3 | Status: SHIPPED | OUTPATIENT
Start: 2022-08-19 | End: 2023-09-22

## 2022-08-19 RX ORDER — SIMVASTATIN 20 MG
20 TABLET ORAL AT BEDTIME
Qty: 90 TABLET | Refills: 3 | Status: SHIPPED | OUTPATIENT
Start: 2022-08-19 | End: 2023-04-17

## 2022-08-19 RX ORDER — TAMSULOSIN HYDROCHLORIDE 0.4 MG/1
0.4 CAPSULE ORAL DAILY
Qty: 90 CAPSULE | Refills: 3 | Status: SHIPPED | OUTPATIENT
Start: 2022-08-19 | End: 2023-04-17

## 2022-08-19 ASSESSMENT — ACTIVITIES OF DAILY LIVING (ADL): CURRENT_FUNCTION: NO ASSISTANCE NEEDED

## 2022-08-19 ASSESSMENT — PAIN SCALES - GENERAL: PAINLEVEL: SEVERE PAIN (6)

## 2022-08-19 NOTE — TELEPHONE ENCOUNTER
I called Mr. Pierre.  Clinical history and exam is consistent with polymyalgia rheumatica.  No clinical signs of giant arteritis.  Recommend treatment with prednisone.  Discussed very long slow taper.      ICD-10-CM    1. Polymyalgia rheumatica (H)  M35.3 predniSONE (DELTASONE) 5 MG tablet     Sedimentation Rate (ESR)     CRP inflammation   2. Type 2 diabetes mellitus without complication, without long-term current use of insulin (H)  E11.9 glipiZIDE (GLUCOTROL) 5 MG tablet   3. Benign prostatic hyperplasia with urinary frequency  N40.1     R35.0    4. Elevated prostate specific antigen (PSA)  R97.20 Prostate Specific Antigen      Orders Placed This Encounter   Medications     glipiZIDE (GLUCOTROL) 5 MG tablet     Sig: Take 1 tablet (5 mg) by mouth 2 times daily (before meals)     Dispense:  180 tablet     Refill:  1     predniSONE (DELTASONE) 5 MG tablet     Sig: Take 3 tablets (15 mg) by mouth daily for 30 days, THEN 2 tablets (10 mg) daily for 30 days.     Dispense:  150 tablet     Refill:  0      -- Start prednisone 15 mg daily   -- Plan for very long slow taper   -- Repeat lab work in 4 weeks   -- Follow-up with Dr. Canseco in clinic in 4 weeks    Signed, Deven Canseco MD, FAAP, FACP  Internal Medicine & Pediatrics

## 2022-08-19 NOTE — NURSING NOTE
Pt presents to clinic today for a wellness visit. Patient states he has been experiencing pain in his shoulders, hips and left jaw for the last 3 weeks now. Also has a sliver of the left hand..      FOOD SECURITY SCREENING QUESTIONS:    The next two questions are to help us understand your food security.  If you are feeling you need any assistance in this area, we have resources available to support you today.    Hunger Vital Signs:  Within the past 12 months we worried whether our food would run out before we got money to buy more. Never  Within the past 12 months the food we bought just didn't last and we didn't have money to get more. Never        Advance care directive on file? no  Advance care directive provided to patient? no     Medication Reconciliation: complete  Gail George LPN,LPN on 8/19/2022 at 7:55 AM

## 2022-08-19 NOTE — PROGRESS NOTES
Review current opioid prescriptions    For a patient with a current opioid prescription:    Reviewed potential Opioid Use Disorder (OUD) risk factors: yes     Evaluate their pain severity and current treatment plan:     Provide information on non-opioid treatment options:    Refer to a specialist, as appropriate:    Get more information on pain management in the HHS Pain Management Best Practices Inter-agency Task Force Report    Screen for potential Substance Use Disorders (SUDs)    Reviewed the patient s potential risk factors for SUDs: yes     Refer to treatment or specialist, as appropriate:     A screening tool isn t required but you may use one:  Find more information in the National [place] on Drug Abuse Screening and Assessment Tools Chart  SUBJECTIVE:   Rosalio Pierre is a 66 year old male who presents for Preventive Visit.    He has a couple of concerns to address today.  He wonders if he can start a prostate pill.  He is having to get up and urinate 4+ times at night.  His stream has been narrowed for many months.  No change in erectile dysfunction.  No blood in the urine.  His sugars are elevated.  They have been closer to 155-160 5 in the morning.  If he checks it later in the day it is closer to 145.  He has been having pain in the shoulders and hips over the course of the last 3 weeks.  It has limited his mobility.  He has also had some pain in the jaw as well.  No temple pain.  He has a sliver in his palm that he cannot get out.  Its been there for 5 weeks.    Patient has been advised of split billing requirements and indicates understanding: Yes  Are you in the first 12 months of your Medicare coverage?  No    Healthy Habits:     In general, how would you rate your overall health?  Good    Frequency of exercise:  4-5 days/week    Duration of exercise:  15-30 minutes    Do you usually eat at least 4 servings of fruit and vegetables a day, include whole grains    & fiber and avoid regularly eating  "high fat or \"junk\" foods?  Yes    Taking medications regularly:  Yes    Medication side effects:  None    Ability to successfully perform activities of daily living:  No assistance needed    Home Safety:  No safety concerns identified    Hearing Impairment:  Difficulty following a conversation in a noisy restaurant or crowded room, feel that people are mumbling or not speaking clearly, need to ask people to speak up or repeat themselves, find that men's voices are easier to understand than woman's and difficulty understanding soft or whispered speech    In the past 6 months, have you been bothered by leaking of urine? Yes    In general, how would you rate your overall mental or emotional health?  Good      PHQ-2 Total Score: 0    Additional concerns today:  Yes    Do you feel safe in your environment? Yes    Have you ever done Advance Care Planning? (For example, a Health Directive, POLST, or a discussion with a medical provider or your loved ones about your wishes): No, advance care planning information given to patient to review.  Patient declined advance care planning discussion at this time.       Fall risk  Fallen 2 or more times in the past year?: No  Any fall with injury in the past year?: No    Cognitive Screening   1) Repeat 3 items (Leader, Season, Table)    2) Clock draw: NORMAL  3) 3 item recall: Recalls 1 object   Results: NORMAL clock, 1-2 items recalled: COGNITIVE IMPAIRMENT LESS LIKELY    Mini-CogTM Copyright CHIN Zimmerman. Licensed by the author for use in [facility]; reprinted with permission ([email or web address]). All rights reserved.      Do you have sleep apnea, excessive snoring or daytime drowsiness?: no    Reviewed and updated as needed this visit by clinical staff   Tobacco  Allergies  Meds      Soc Hx          Reviewed and updated as needed this visit by Provider                   Social History     Tobacco Use     Smoking status: Never Smoker     Smokeless tobacco: Never Used " "  Substance Use Topics     Alcohol use: Yes     Comment: Alcoholic Drinks/day: Seldom         Alcohol Use 8/12/2022   Prescreen: >3 drinks/day or >7 drinks/week? No   Prescreen: >3 drinks/day or >7 drinks/week? -               Current providers sharing in care for this patient include:   Patient Care Team:  Deven Canseco MD as PCP - General (Pediatrics)  Deven Canseco MD as Assigned PCP    The following health maintenance items are reviewed in Epic and correct as of today:  Health Maintenance Due   Topic Date Due     MICROALBUMIN  Never done     DTAP/TDAP/TD IMMUNIZATION (1 - Tdap) Never done     ZOSTER IMMUNIZATION (1 of 2) Never done     DIABETIC FOOT EXAM  11/09/2021     EYE EXAM  11/19/2021     A1C  04/18/2022     [facility]  Immunizations are reviewed        Review of Systems  Constitutional, HEENT, cardiovascular, pulmonary, gi and gu systems are negative, except as otherwise noted.    OBJECTIVE:   /88 (BP Location: Right arm, Patient Position: Sitting, Cuff Size: Adult Large)   Pulse 90   Temp 98.7  F (37.1  C) (Tympanic)   Resp 22   Ht 1.753 m (5' 9\")   Wt 83.5 kg (184 lb)   SpO2 97%   BMI 27.17 kg/m   Estimated body mass index is 27.17 kg/m  as calculated from the following:    Height as of this encounter: 1.753 m (5' 9\").    Weight as of this encounter: 83.5 kg (184 lb).  Physical Exam  HEENT: No carotid bruits  Cardiovascular: Regular, no murmur  Pulmonary: Clear  MSK no edema.  No pain elicited with internal or external rotation at hip.  There is some pain with passive abduction at the shoulder.  There is tenderness over the left TMJ without crepitus.  Skin: Palm of left hand there is a linear streak with slight surrounding erythema      Diagnostic Test Results:  Labs reviewed in Epic    ASSESSMENT / PLAN:       ICD-10-CM    1. Encounter for Medicare annual wellness exam  Z00.00    2. Benign prostatic hyperplasia with urinary frequency  N40.1 tamsulosin (FLOMAX) " 0.4 MG capsule    R35.0 PSA tumor marker     PSA tumor marker   3. Type 2 diabetes mellitus without complication, without long-term current use of insulin (H)  E11.9 Basic metabolic panel     Hemoglobin A1c     Albumin Random Urine Quantitative with Creat Ratio     simvastatin (ZOCOR) 20 MG tablet     metFORMIN (GLUCOPHAGE) 500 MG tablet     blood glucose (NO BRAND SPECIFIED) lancets standard     blood glucose (NO BRAND SPECIFIED) test strip     blood glucose monitoring (NO BRAND SPECIFIED) meter device kit     Hemoglobin A1c     Basic metabolic panel   4. Mixed hyperlipidemia  E78.2 Lipid Profile     simvastatin (ZOCOR) 20 MG tablet     Lipid Profile   5. Essential (primary) hypertension  I10 ramipril (ALTACE) 10 MG capsule   6. Need for vaccination  Z23 Tdap, tetanus-diptheria-acell pertussis, (BOOSTRIX) 5-2.5-18.5 LF-MCG/0.5 SUSP injection     zoster vaccine recombinant adjuvanted (SHINGRIX) injection   7. Screening, anemia, deficiency, iron  Z13.0 CBC with platelets     CBC with platelets   8. Acute pain of both shoulders  M25.511 CRP inflammation    M25.512 Erythrocyte sedimentation rate auto     Erythrocyte sedimentation rate auto     CRP inflammation   9. Hip pain, bilateral  M25.551 CRP inflammation    M25.552 Erythrocyte sedimentation rate auto     Erythrocyte sedimentation rate auto     CRP inflammation   10. Left-sided temporomandibular joint pain-dysfunction syndrome  M26.622    11. Sliver  T14.8XXA      With regards to the sliver its been there too long.  Simply removing the sliver would not be possible at this time.  Discussed the possibility for an excisional biopsy and the patient defers monitoring.  Recommend topical antibiotic ointment and warm water soaks.  Would refer to general surgery if symptoms persist or worsen.    Hip and shoulder girdle pain may be acute on chronic osteoarthritis however differential diagnosis also includes polymyalgia rheumatica, others.  Sed rate and CRP with labs today.  " If these are normal conservative approach will be recommended.    Pain in the jaw appears to be TMJ.  I recommend he sees his dentist.    His symptoms do sound consistent with benign prostate enlargement.  We discussed the possibility for prostate cancer as well.  We are obtaining a PSA level today.        COUNSELING:  Reviewed preventive health counseling, as reflected in patient instructions    Estimated body mass index is 27.17 kg/m  as calculated from the following:    Height as of this encounter: 1.753 m (5' 9\").    Weight as of this encounter: 83.5 kg (184 lb).    Weight management plan: Discussed healthy diet and exercise guidelines    He reports that he has never smoked. He has never used smokeless tobacco.      Appropriate preventive services were discussed with this patient, including applicable screening as appropriate for cardiovascular disease, diabetes, osteopenia/osteoporosis, and glaucoma.  As appropriate for age/gender, discussed screening for colorectal cancer, prostate cancer, breast cancer, and cervical cancer. Checklist reviewing preventive services available has been given to the patient.    Reviewed patients plan of care and provided an AVS. The Basic Care Plan (routine screening as documented in Health Maintenance) for Rosalio meets the Care Plan requirement. This Care Plan has been established and reviewed with the Patient.    Counseling Resources:  ATP IV Guidelines  Pooled Cohorts Equation Calculator  Breast Cancer Risk Calculator  Breast Cancer: Medication to Reduce Risk  FRAX Risk Assessment  ICSI Preventive Guidelines  Dietary Guidelines for Americans, 2010  USDA's MyPlate  ASA Prophylaxis  Lung CA Screening    Deven Canseco MD  [facility] [place]    Identified Health Risks:  "

## 2022-08-19 NOTE — PATIENT INSTRUCTIONS
Patient Education   Personalized Prevention Plan  You are due for the preventive services outlined below.  Your care team is available to assist you in scheduling these services.  If you have already completed any of these items, please share that information with your care team to update in your medical record.  Health Maintenance Due   Topic Date Due     Kidney Microalbumin Urine Test  Never done     Diptheria Tetanus Pertussis (DTAP/TDAP/TD) Vaccine (1 - Tdap) Never done     Zoster (Shingles) Vaccine (1 of 2) Never done     Diabetic Foot Exam  11/09/2021     Eye Exam  11/19/2021     A1C Lab  04/18/2022       Signs of Hearing Loss      Hearing much better with one ear can be a sign of hearing loss.   Hearing loss is a problem shared by many people. In fact, it is one of the most common health problems, particularly as people age. Most people age 65 and older have some hearing loss. By age 80, almost everyone does. Hearing loss often occurs slowly over the years. So you may not realize your hearing has gotten worse.  Have your hearing checked  Call your healthcare provider if you:    Have to strain to hear normal conversation    Have to watch other people s faces very carefully to follow what they re saying    Need to ask people to repeat what they ve said    Often misunderstand what people are saying    Turn the volume of the television or radio up so high that others complain    Feel that people are mumbling when they re talking to you    Find that the effort to hear leaves you feeling tired and irritated    Notice, when using the phone, that you hear better with one ear than the other  Compendium last reviewed this educational content on 1/1/2020 2000-2021 [employer]. All rights reserved. This information is not intended as a substitute for professional medical care. Always follow your healthcare professional's instructions.          Urinary Incontinence (Male)    Urinary incontinence means not  being able to control the release of urine from the bladder.   Causes  Common causes of urinary incontinence in men include:    Infection    Certain medicines    Aging    Poor pelvic muscle tone    Bladder spasms    Obesity    Trouble urinating and fully emptying the bladder (urinary retention)  Other things that can cause incontinence are:     Nervous system diseases    Diabetes    Sleep apnea    Urinary tract infections    Prostate surgery    Pelvic injury  Constipation and smoking have also been identified as risk factors.   Symptoms    Urge incontinence (overactive bladder). This is a sudden urge to urinate. It occurs even though there may not be much urine in the bladder. The need to urinate often during the night is common. It's due to bladder spasms.    Stress incontinence. This is urine leakage that you can't control. It can occur with sneezing, coughing, and other actions that put stress on the bladder.    Treatment  Treatment depends on what is causing the condition. Bladder infections are treated with antibiotics. Urinary retention is treated with a bladder catheter.   Home care  Follow these guidelines when caring for yourself at home:    Don't have any foods and drinks that may irritate the bladder. This includes:  ? Chocolate  ? Alcohol  ? Caffeine  ? Carbonated drinks  ? Acidic fruits and juices    Limit fluids to 6 to 8 cups a day.    Lose weight if you are overweight. This will reduce your symptoms.    If advised, do regular pelvic muscle-strengthening exercises such as Kegel exercises.    If needed, wear absorbent pads to catch urine. Change the pads often. This is for good hygiene and to prevent skin and bladder infections.    Bathe daily for good hygiene.    If an antibiotic was prescribed to treat a bladder infection, take it until it's finished. Keep taking it even if you are feeling better. This is to make sure your infection has cleared.    If a catheter was left in place, keep bacteria from  getting into the collection bag. Don't disconnect the catheter from the collection bag.    Use a leg band to secure the catheter drainage tube, so it does not pull on the catheter. Drain the collection bag when it becomes full. To do this, use the drain spout at the bottom of the bag. Don't disconnect the bag from the catheter.    Don't pull on or try to remove a catheter. The catheter must be removed by a healthcare provider.    If you smoke, stop. Ask your provider for help if you can't do this on your own.  Follow-up care  Follow up with your healthcare provider, or as advised.  When to get medical advice  Call your healthcare provider right away if any of these occur:    Fever over 100.4 F (38 C), or as directed by your provider    Bladder pain or fullness    Belly swelling, nausea, or vomiting    Back pain    Weakness, dizziness, or fainting    If a catheter was left in place, return if:  ? The catheter falls out  ? The catheter stops draining for 6 hours  ? Your urine gets cloudy or smells bad  [employer] last reviewed this educational content on 1/1/2020 2000-2021 [employer]. All rights reserved. This information is not intended as a substitute for professional medical care. Always follow your healthcare professional's instructions.

## 2022-08-20 ENCOUNTER — MYC MEDICAL ADVICE (OUTPATIENT)
Dept: PEDIATRICS | Facility: OTHER | Age: 67
End: 2022-08-20

## 2022-08-25 ENCOUNTER — TELEPHONE (OUTPATIENT)
Dept: PEDIATRICS | Facility: OTHER | Age: 67
End: 2022-08-25

## 2022-08-25 NOTE — TELEPHONE ENCOUNTER
[facility] faxed over the request for Medicare Audit and diabetes.      Anna Taylor LPN 8/25/2022 5:02 PM

## 2022-09-17 ENCOUNTER — HEALTH MAINTENANCE LETTER (OUTPATIENT)
Age: 67
End: 2022-09-17

## 2022-09-20 ENCOUNTER — LAB (OUTPATIENT)
Dept: LAB | Facility: OTHER | Age: 67
End: 2022-09-20
Attending: INTERNAL MEDICINE
Payer: MEDICARE

## 2022-09-20 DIAGNOSIS — R97.20 ELEVATED PROSTATE SPECIFIC ANTIGEN (PSA): ICD-10-CM

## 2022-09-20 DIAGNOSIS — M35.3 POLYMYALGIA RHEUMATICA (H): ICD-10-CM

## 2022-09-20 DIAGNOSIS — E53.8 VITAMIN B12 DEFICIENCY (NON ANEMIC): ICD-10-CM

## 2022-09-20 DIAGNOSIS — E61.1 IRON DEFICIENCY: ICD-10-CM

## 2022-09-20 DIAGNOSIS — D64.9 ANEMIA, NORMOCYTIC NORMOCHROMIC: ICD-10-CM

## 2022-09-20 DIAGNOSIS — E11.9 TYPE 2 DIABETES MELLITUS WITHOUT COMPLICATION, WITHOUT LONG-TERM CURRENT USE OF INSULIN (H): ICD-10-CM

## 2022-09-20 LAB
CREAT UR-MCNC: 88.3 MG/DL
CRP SERPL-MCNC: 4 MG/L
ERYTHROCYTE [DISTWIDTH] IN BLOOD BY AUTOMATED COUNT: 15.5 % (ref 10–15)
ERYTHROCYTE [SEDIMENTATION RATE] IN BLOOD BY WESTERGREN METHOD: 23 MM/HR (ref 0–20)
HCT VFR BLD AUTO: 33.5 % (ref 40–53)
HGB BLD-MCNC: 11.1 G/DL (ref 13.3–17.7)
IRON SATN MFR SERPL: 27 % (ref 20–55)
IRON SERPL-MCNC: 104 UG/DL (ref 50–212)
MCH RBC QN AUTO: 29.2 PG (ref 26.5–33)
MCHC RBC AUTO-ENTMCNC: 33.1 G/DL (ref 31.5–36.5)
MCV RBC AUTO: 88 FL (ref 78–100)
MICROALBUMIN UR-MCNC: <12 MG/L
MICROALBUMIN/CREAT UR: NORMAL MG/G{CREAT}
PLATELET # BLD AUTO: 179 10E3/UL (ref 150–450)
PSA SERPL-MCNC: 5.54 UG/L (ref 0–4)
RBC # BLD AUTO: 3.8 10E6/UL (ref 4.4–5.9)
TIBC SERPL-MCNC: 379.4 UG/DL (ref 245–400)
TRANSFERRIN SERPL-MCNC: 271 MG/DL (ref 203–362)
UIBC (UNSATURATED): 275.4 MG/DL
VIT B12 SERPL-MCNC: 419 PG/ML (ref 180–914)
WBC # BLD AUTO: 5.9 10E3/UL (ref 4–11)

## 2022-09-20 PROCEDURE — 86140 C-REACTIVE PROTEIN: CPT | Mod: ZL

## 2022-09-20 PROCEDURE — 83550 IRON BINDING TEST: CPT | Mod: ZL

## 2022-09-20 PROCEDURE — 84153 ASSAY OF PSA TOTAL: CPT | Mod: ZL

## 2022-09-20 PROCEDURE — 82043 UR ALBUMIN QUANTITATIVE: CPT | Mod: ZL

## 2022-09-20 PROCEDURE — 36415 COLL VENOUS BLD VENIPUNCTURE: CPT | Mod: ZL

## 2022-09-20 PROCEDURE — 85027 COMPLETE CBC AUTOMATED: CPT | Mod: ZL

## 2022-09-20 PROCEDURE — 82607 VITAMIN B-12: CPT | Mod: ZL

## 2022-09-20 PROCEDURE — 85652 RBC SED RATE AUTOMATED: CPT | Mod: ZL

## 2022-09-22 ENCOUNTER — MYC MEDICAL ADVICE (OUTPATIENT)
Dept: PEDIATRICS | Facility: OTHER | Age: 67
End: 2022-09-22

## 2022-09-22 DIAGNOSIS — M35.3 POLYMYALGIA RHEUMATICA (H): ICD-10-CM

## 2022-09-22 RX ORDER — PREDNISONE 5 MG/1
10 TABLET ORAL DAILY
Qty: 60 TABLET | Refills: 0 | Status: SHIPPED | OUTPATIENT
Start: 2022-09-22 | End: 2022-10-25

## 2022-10-25 ENCOUNTER — OFFICE VISIT (OUTPATIENT)
Dept: PEDIATRICS | Facility: OTHER | Age: 67
End: 2022-10-25
Attending: INTERNAL MEDICINE
Payer: COMMERCIAL

## 2022-10-25 VITALS
TEMPERATURE: 98.2 F | HEIGHT: 68 IN | BODY MASS INDEX: 29.61 KG/M2 | HEART RATE: 74 BPM | DIASTOLIC BLOOD PRESSURE: 80 MMHG | SYSTOLIC BLOOD PRESSURE: 138 MMHG | RESPIRATION RATE: 20 BRPM | WEIGHT: 195.4 LBS | OXYGEN SATURATION: 98 %

## 2022-10-25 DIAGNOSIS — M35.3 POLYMYALGIA RHEUMATICA (H): Primary | ICD-10-CM

## 2022-10-25 DIAGNOSIS — E11.9 TYPE 2 DIABETES MELLITUS WITHOUT COMPLICATION, WITHOUT LONG-TERM CURRENT USE OF INSULIN (H): ICD-10-CM

## 2022-10-25 PROCEDURE — 99214 OFFICE O/P EST MOD 30 MIN: CPT | Performed by: INTERNAL MEDICINE

## 2022-10-25 PROCEDURE — G0463 HOSPITAL OUTPT CLINIC VISIT: HCPCS

## 2022-10-25 RX ORDER — PREDNISONE 1 MG/1
TABLET ORAL
Qty: 120 TABLET | Refills: 3 | Status: SHIPPED | OUTPATIENT
Start: 2022-10-25 | End: 2023-04-17

## 2022-10-25 RX ORDER — GLIPIZIDE 5 MG/1
5 TABLET ORAL DAILY
Qty: 90 TABLET | Refills: 3 | Status: SHIPPED | OUTPATIENT
Start: 2022-10-25 | End: 2023-04-17

## 2022-10-25 RX ORDER — PREDNISONE 5 MG/1
TABLET ORAL
Qty: 90 TABLET | Refills: 3 | Status: SHIPPED | OUTPATIENT
Start: 2022-10-25 | End: 2023-04-17

## 2022-10-25 ASSESSMENT — PAIN SCALES - GENERAL: PAINLEVEL: NO PAIN (0)

## 2022-10-25 NOTE — PROGRESS NOTES
Assessment & Plan   1. Polymyalgia rheumatica (H)  Glad to see that his symptoms have significantly resolved.  We discussed the natural history of polymyalgia rheumatica.  A long slow taper is recommended.  If he is unable to taper, symptoms resolve, or concerning symptoms return would request rheumatology consultation.  - predniSONE (DELTASONE) 1 MG tablet; 9 mg daily  Dispense: 120 tablet; Refill: 3  - predniSONE (DELTASONE) 5 MG tablet; 9 mg daily  Dispense: 90 tablet; Refill: 3    2. Type 2 diabetes mellitus without complication, without long-term current use of insulin (H)  He has been going low at nighttime.  We decided to reduce glipizide to just in the daytime.  Blood glucose monitoring is recommended.  - glipiZIDE (GLUCOTROL) 5 MG tablet; Take 1 tablet (5 mg) by mouth daily  Dispense: 90 tablet; Refill: 3  - blood glucose (NO BRAND SPECIFIED) lancets standard; Use to test blood sugar 2 time daily. On Prednisone, elevated A1c.  Please dispense what is covered by insurance.  Dispense: 100 each; Refill: 11  - blood glucose (NO BRAND SPECIFIED) test strip; Use to test blood sugar 2 time daily. On Prednisone, elevated A1c.  Please dispense item covered by insurance.  Dispense: 100 strip; Refill: 11  - blood glucose monitoring (NO BRAND SPECIFIED) meter device kit; Use to test blood sugar 2 time daily.  On Prednisone, elevated A1c. Please dispense item covered by insurance.  Dispense: 1 kit; Refill: 1      Patient Instructions    -- Reduce prednisone to 9 mg   -- Plan to reduce by 1 mg per month   -- When you reach 3 mg, would discontinue   -- If unable to taper due to symptoms or symptoms are returning would refer to Rheumatology          Return in about 6 months (around 4/25/2023), or if symptoms worsen or fail to improve, for med management.    Signed, Deven Canseco MD, FAAP, FACP  Internal Medicine & Pediatrics    Subjective   Rosalio Pierre is a 66 year old male who presents for follow-up prednisone.  Since  "starting the prednisone he feels so much better.  Nearly immediately the pain across to his shoulders, hip girdle and left jaw has resolved or nearly resolved.  He did notice some slight recurrence of symptoms with tapering of dosage which resolved once stabilizing out on the new dose.  He has had no insomnia or heartburn.  It has affected his blood sugar.  He was started on glipizide.  Now he is getting some lows at nighttime.    Objective   Vitals: /80   Pulse 74   Temp 98.2  F (36.8  C) (Tympanic)   Resp 20   Ht 1.734 m (5' 8.25\")   Wt 88.6 kg (195 lb 6.4 oz)   SpO2 98%   BMI 29.49 kg/m        Review and Analysis of Data   I personally reviewed the following:  External notes: No  Results: Yes Most recent diabetic labs are reviewed  Use of an independent historian: No  Independent review of a test performed by another physician: No  Discussion of management with another physician: No  Moderate risk of morbidity from additional diagnostic testing and/or treatment.    "

## 2022-10-25 NOTE — Clinical Note
Please abstract the following data from this visit with this patient into the appropriate field in Epic:  Tests that can be patient reported without a hard copy:  Eye exam with ophthalmology on this date: June 2022 [facility] [place] provided by patient  Other Tests found in the patient's chart through Chart Review/Care Everywhere:  Eye exam with ophthalmology on this date: June 2022 [facility] [place] provided by patient  Note to Abstraction: If this section is blank, no results were found via Chart Review/Care Everywhere.

## 2022-10-25 NOTE — NURSING NOTE
"Chief Complaint   Patient presents with     Recheck Medication     Prednisone         Initial /80   Pulse 74   Temp 98.2  F (36.8  C) (Tympanic)   Resp 20   Ht 1.734 m (5' 8.25\")   Wt 88.6 kg (195 lb 6.4 oz)   SpO2 98%   BMI 29.49 kg/m   Estimated body mass index is 29.49 kg/m  as calculated from the following:    Height as of this encounter: 1.734 m (5' 8.25\").    Weight as of this encounter: 88.6 kg (195 lb 6.4 oz).         Norma J. Gosselin, LPN   "

## 2022-10-25 NOTE — PATIENT INSTRUCTIONS
-- Reduce prednisone to 9 mg   -- Plan to reduce by 1 mg per month   -- When you reach 3 mg, would discontinue   -- If unable to taper due to symptoms or symptoms are returning would refer to Rheumatology

## 2023-04-17 ENCOUNTER — OFFICE VISIT (OUTPATIENT)
Dept: PEDIATRICS | Facility: OTHER | Age: 68
End: 2023-04-17
Attending: INTERNAL MEDICINE
Payer: MEDICARE

## 2023-04-17 VITALS
TEMPERATURE: 98.8 F | WEIGHT: 200.9 LBS | SYSTOLIC BLOOD PRESSURE: 134 MMHG | DIASTOLIC BLOOD PRESSURE: 80 MMHG | OXYGEN SATURATION: 96 % | RESPIRATION RATE: 16 BRPM | BODY MASS INDEX: 30.32 KG/M2 | HEART RATE: 82 BPM

## 2023-04-17 DIAGNOSIS — N40.1 BENIGN PROSTATIC HYPERPLASIA WITH URINARY FREQUENCY: ICD-10-CM

## 2023-04-17 DIAGNOSIS — E78.2 MIXED HYPERLIPIDEMIA: ICD-10-CM

## 2023-04-17 DIAGNOSIS — D64.9 ANEMIA, UNSPECIFIED TYPE: ICD-10-CM

## 2023-04-17 DIAGNOSIS — R35.0 BENIGN PROSTATIC HYPERPLASIA WITH URINARY FREQUENCY: ICD-10-CM

## 2023-04-17 DIAGNOSIS — M35.3 PMR (POLYMYALGIA RHEUMATICA) (H): ICD-10-CM

## 2023-04-17 DIAGNOSIS — I10 ESSENTIAL (PRIMARY) HYPERTENSION: ICD-10-CM

## 2023-04-17 DIAGNOSIS — E11.9 TYPE 2 DIABETES MELLITUS WITHOUT COMPLICATION, WITHOUT LONG-TERM CURRENT USE OF INSULIN (H): Primary | ICD-10-CM

## 2023-04-17 DIAGNOSIS — Z12.11 COLON CANCER SCREENING: ICD-10-CM

## 2023-04-17 LAB
ANION GAP SERPL CALCULATED.3IONS-SCNC: 11 MMOL/L (ref 7–15)
BUN SERPL-MCNC: 19 MG/DL (ref 8–23)
CALCIUM SERPL-MCNC: 9.2 MG/DL (ref 8.8–10.2)
CHLORIDE SERPL-SCNC: 101 MMOL/L (ref 98–107)
CHOLEST SERPL-MCNC: 124 MG/DL
CREAT SERPL-MCNC: 1.15 MG/DL (ref 0.67–1.17)
CRP SERPL-MCNC: 21.7 MG/L
DEPRECATED HCO3 PLAS-SCNC: 27 MMOL/L (ref 22–29)
ERYTHROCYTE [DISTWIDTH] IN BLOOD BY AUTOMATED COUNT: 13.1 % (ref 10–15)
ERYTHROCYTE [SEDIMENTATION RATE] IN BLOOD BY WESTERGREN METHOD: 44 MM/HR (ref 0–20)
GFR SERPL CREATININE-BSD FRML MDRD: 69 ML/MIN/1.73M2
GLUCOSE SERPL-MCNC: 155 MG/DL (ref 70–99)
HBA1C MFR BLD: 7.1 % (ref 4–6.2)
HCT VFR BLD AUTO: 34.3 % (ref 40–53)
HDLC SERPL-MCNC: 35 MG/DL
HGB BLD-MCNC: 11.6 G/DL (ref 13.3–17.7)
IRON BINDING CAPACITY (ROCHE): 298 UG/DL (ref 240–430)
IRON SATN MFR SERPL: 23 % (ref 15–46)
IRON SERPL-MCNC: 70 UG/DL (ref 61–157)
LDLC SERPL CALC-MCNC: 62 MG/DL
MCH RBC QN AUTO: 31.9 PG (ref 26.5–33)
MCHC RBC AUTO-ENTMCNC: 33.8 G/DL (ref 31.5–36.5)
MCV RBC AUTO: 94 FL (ref 78–100)
NONHDLC SERPL-MCNC: 89 MG/DL
PLATELET # BLD AUTO: 243 10E3/UL (ref 150–450)
POTASSIUM SERPL-SCNC: 4.4 MMOL/L (ref 3.4–5.3)
RBC # BLD AUTO: 3.64 10E6/UL (ref 4.4–5.9)
SODIUM SERPL-SCNC: 139 MMOL/L (ref 136–145)
TRIGL SERPL-MCNC: 137 MG/DL
VIT B12 SERPL-MCNC: 1516 PG/ML (ref 232–1245)
WBC # BLD AUTO: 7.7 10E3/UL (ref 4–11)

## 2023-04-17 PROCEDURE — 82607 VITAMIN B-12: CPT | Mod: ZL | Performed by: INTERNAL MEDICINE

## 2023-04-17 PROCEDURE — 36415 COLL VENOUS BLD VENIPUNCTURE: CPT | Mod: ZL | Performed by: INTERNAL MEDICINE

## 2023-04-17 PROCEDURE — 85027 COMPLETE CBC AUTOMATED: CPT | Mod: ZL | Performed by: INTERNAL MEDICINE

## 2023-04-17 PROCEDURE — 83550 IRON BINDING TEST: CPT | Mod: ZL | Performed by: INTERNAL MEDICINE

## 2023-04-17 PROCEDURE — 99214 OFFICE O/P EST MOD 30 MIN: CPT | Performed by: INTERNAL MEDICINE

## 2023-04-17 PROCEDURE — G0463 HOSPITAL OUTPT CLINIC VISIT: HCPCS

## 2023-04-17 PROCEDURE — 86140 C-REACTIVE PROTEIN: CPT | Mod: ZL | Performed by: INTERNAL MEDICINE

## 2023-04-17 PROCEDURE — 83036 HEMOGLOBIN GLYCOSYLATED A1C: CPT | Mod: ZL | Performed by: INTERNAL MEDICINE

## 2023-04-17 PROCEDURE — 80061 LIPID PANEL: CPT | Mod: ZL | Performed by: INTERNAL MEDICINE

## 2023-04-17 PROCEDURE — 80048 BASIC METABOLIC PNL TOTAL CA: CPT | Mod: ZL | Performed by: INTERNAL MEDICINE

## 2023-04-17 PROCEDURE — 85652 RBC SED RATE AUTOMATED: CPT | Mod: ZL | Performed by: INTERNAL MEDICINE

## 2023-04-17 RX ORDER — TAMSULOSIN HYDROCHLORIDE 0.4 MG/1
0.4 CAPSULE ORAL DAILY
Qty: 90 CAPSULE | Refills: 3 | Status: SHIPPED | OUTPATIENT
Start: 2023-04-17 | End: 2023-09-22

## 2023-04-17 RX ORDER — GLIPIZIDE 5 MG/1
5 TABLET ORAL DAILY
Qty: 90 TABLET | Refills: 3 | Status: SHIPPED | OUTPATIENT
Start: 2023-04-17 | End: 2023-09-22

## 2023-04-17 RX ORDER — RAMIPRIL 10 MG/1
10 CAPSULE ORAL DAILY
Qty: 90 CAPSULE | Refills: 3 | Status: SHIPPED | OUTPATIENT
Start: 2023-04-17 | End: 2023-09-22

## 2023-04-17 RX ORDER — SIMVASTATIN 20 MG
20 TABLET ORAL AT BEDTIME
Qty: 90 TABLET | Refills: 3 | Status: SHIPPED | OUTPATIENT
Start: 2023-04-17 | End: 2023-09-22

## 2023-04-17 ASSESSMENT — PAIN SCALES - GENERAL: PAINLEVEL: NO PAIN (0)

## 2023-04-17 NOTE — PATIENT INSTRUCTIONS
-- Watch sugars, and send Dr. Canseco a log if going too high or low   -- Work on 5% weight loss   -- Daily exercise     -- Tetanus and shingles at your pharmacy

## 2023-04-17 NOTE — NURSING NOTE
"Chief Complaint   Patient presents with     Recheck Medication         Initial /80   Pulse 82   Temp 98.8  F (37.1  C) (Tympanic)   Resp 16   Wt 91.1 kg (200 lb 14.4 oz)   SpO2 96%   BMI 30.32 kg/m   Estimated body mass index is 30.32 kg/m  as calculated from the following:    Height as of 10/25/22: 1.734 m (5' 8.25\").    Weight as of this encounter: 91.1 kg (200 lb 14.4 oz).         Norma J. Gosselin, LPN   "

## 2023-04-17 NOTE — PROGRESS NOTES
Assessment & Plan   1. Type 2 diabetes mellitus without complication, without long-term current use of insulin (H)  His last A1c was uncontrolled.  Home glucose numbers reported sound to be within acceptable range.  Significant fluctuation will likely have and will occur based on recent weight gain, prednisone use, inactivity of winter, etc.  No medication adjustments are made at this time.  If A1c is significantly increased would go up from glipizide 5 daily to twice daily.  May need to make adjustments over MyChart throughout the summer when he is in [place].  - FOOT EXAM  - glipiZIDE (GLUCOTROL) 5 MG tablet; Take 1 tablet (5 mg) by mouth daily  Dispense: 90 tablet; Refill: 3  - simvastatin (ZOCOR) 20 MG tablet; Take 1 tablet (20 mg) by mouth At Bedtime  Dispense: 90 tablet; Refill: 3  - Basic metabolic panel; Future  - Hemoglobin A1c; Future  - Basic metabolic panel  - Hemoglobin A1c    2. PMR (polymyalgia rheumatica) (H)  Symptoms have essentially resolved.  CRP and sed rate for baseline.  Okay to discontinue prednisone at 3 mg.  - CRP inflammation; Future  - Erythrocyte sedimentation rate auto; Future  - Basic metabolic panel; Future  - CRP inflammation  - Erythrocyte sedimentation rate auto  - Basic metabolic panel    3. Essential (primary) hypertension  At goal, no change.  - ramipril (ALTACE) 10 MG capsule; Take 1 capsule (10 mg) by mouth daily  Dispense: 90 capsule; Refill: 3    4. Mixed hyperlipidemia  At goal, no change.  - simvastatin (ZOCOR) 20 MG tablet; Take 1 tablet (20 mg) by mouth At Bedtime  Dispense: 90 tablet; Refill: 3  - Lipid Profile; Future  - Lipid Profile    5. Benign prostatic hyperplasia with urinary frequency  We will recheck a PSA this fall  - tamsulosin (FLOMAX) 0.4 MG capsule; Take 1 capsule (0.4 mg) by mouth daily  Dispense: 90 capsule; Refill: 3    6. Anemia, unspecified type  - CBC with platelets; Future  - Vitamin B12; Future  - Iron & Iron Binding Capacity; Future  - CBC with  platelets  - Vitamin B12  - Iron & Iron Binding Capacity    7. Colon cancer screening  - COLOGUARD(EXACT SCIENCES); Future      Patient Instructions    -- Watch sugars, and send Dr. Canseco a log if going too high or low   -- Work on 5% weight loss   -- Daily exercise     -- Tetanus and shingles at your pharmacy      Return in about 5 months (around 9/17/2023) for med management.    Signed, Deven Canseco MD, FAAP, FACP  Internal Medicine & Pediatrics    Subjective   Rosalio Pierre is a 67 year old male who presents for medication management.  He has been feeling much better from his achiness perspective.  He was diagnosed with polymyalgia rheumatica last year and started on a very slow prednisone taper.  He is down to 3 mg.  He did experience some aching every time he went down by 1 mg but overall he feels much better.  He did gain some weight.  His blood sugars have been a little higher than normal.  At bedtime its around 130 and has gotten as high as 190 throughout the day.    Objective   Vitals: /80   Pulse 82   Temp 98.8  F (37.1  C) (Tympanic)   Resp 16   Wt 91.1 kg (200 lb 14.4 oz)   SpO2 96%   BMI 30.32 kg/m      General: well appearing  HEENT: No bruits  CV: Regular rate and rhythm, no murmur, rub or gallop  Pulm: Clear to auscultation bilaterally, no wheezing, rales or rhonchi  Neuro: Grossly intact  Musculoskeletal: No lower extremity edema  Skin: No rash  Psychiatry: Normal affect and insight.     Foot Exam:  4/17/2023  Intact to monofilament bilaterally.  Skin intact without erythema.      Review and Analysis of Data   I personally reviewed the following:  External notes: No  Results: Yes Most recent lab work is reviewed  Use of an independent historian: No  Independent review of a test performed by another physician: No  Discussion of management with another physician: No  Moderate risk of morbidity from additional diagnostic testing and/or treatment.

## 2023-04-24 ENCOUNTER — LAB (OUTPATIENT)
Dept: PEDIATRICS | Facility: OTHER | Age: 68
End: 2023-04-24
Payer: COMMERCIAL

## 2023-04-24 DIAGNOSIS — Z12.11 COLON CANCER SCREENING: ICD-10-CM

## 2023-07-03 ENCOUNTER — MYC MEDICAL ADVICE (OUTPATIENT)
Dept: PEDIATRICS | Facility: OTHER | Age: 68
End: 2023-07-03
Payer: COMMERCIAL

## 2023-07-03 DIAGNOSIS — M35.3 PMR (POLYMYALGIA RHEUMATICA) (H): Primary | ICD-10-CM

## 2023-09-21 ENCOUNTER — TRANSFERRED RECORDS (OUTPATIENT)
Dept: HEALTH INFORMATION MANAGEMENT | Facility: OTHER | Age: 68
End: 2023-09-21
Payer: COMMERCIAL

## 2023-09-21 LAB — RETINOPATHY: NEGATIVE

## 2023-09-22 ENCOUNTER — OFFICE VISIT (OUTPATIENT)
Dept: PEDIATRICS | Facility: OTHER | Age: 68
End: 2023-09-22
Attending: INTERNAL MEDICINE
Payer: MEDICARE

## 2023-09-22 VITALS
OXYGEN SATURATION: 98 % | HEIGHT: 68 IN | SYSTOLIC BLOOD PRESSURE: 128 MMHG | RESPIRATION RATE: 16 BRPM | HEART RATE: 74 BPM | WEIGHT: 194.6 LBS | TEMPERATURE: 98.6 F | BODY MASS INDEX: 29.49 KG/M2 | DIASTOLIC BLOOD PRESSURE: 76 MMHG

## 2023-09-22 DIAGNOSIS — E78.2 MIXED HYPERLIPIDEMIA: ICD-10-CM

## 2023-09-22 DIAGNOSIS — R35.0 BENIGN PROSTATIC HYPERPLASIA WITH URINARY FREQUENCY: ICD-10-CM

## 2023-09-22 DIAGNOSIS — D64.9 ANEMIA, NORMOCYTIC NORMOCHROMIC: ICD-10-CM

## 2023-09-22 DIAGNOSIS — N40.1 BENIGN PROSTATIC HYPERPLASIA WITH URINARY FREQUENCY: ICD-10-CM

## 2023-09-22 DIAGNOSIS — I10 ESSENTIAL (PRIMARY) HYPERTENSION: ICD-10-CM

## 2023-09-22 DIAGNOSIS — E11.9 TYPE 2 DIABETES MELLITUS WITHOUT COMPLICATION, WITHOUT LONG-TERM CURRENT USE OF INSULIN (H): Primary | ICD-10-CM

## 2023-09-22 DIAGNOSIS — M35.3 PMR (POLYMYALGIA RHEUMATICA) (H): Chronic | ICD-10-CM

## 2023-09-22 LAB
ALBUMIN UR-MCNC: NEGATIVE MG/DL
ANION GAP SERPL CALCULATED.3IONS-SCNC: 11 MMOL/L (ref 7–15)
APPEARANCE UR: CLEAR
BILIRUB UR QL STRIP: NEGATIVE
BUN SERPL-MCNC: 22.1 MG/DL (ref 8–23)
CALCIUM SERPL-MCNC: 10 MG/DL (ref 8.8–10.2)
CHLORIDE SERPL-SCNC: 106 MMOL/L (ref 98–107)
CHOLEST SERPL-MCNC: 139 MG/DL
COLOR UR AUTO: ABNORMAL
CREAT SERPL-MCNC: 1.15 MG/DL (ref 0.67–1.17)
CREAT UR-MCNC: 146.7 MG/DL
CRP SERPL-MCNC: 5.76 MG/L
DEPRECATED HCO3 PLAS-SCNC: 25 MMOL/L (ref 22–29)
EGFRCR SERPLBLD CKD-EPI 2021: 69 ML/MIN/1.73M2
ERYTHROCYTE [DISTWIDTH] IN BLOOD BY AUTOMATED COUNT: 14 % (ref 10–15)
ERYTHROCYTE [SEDIMENTATION RATE] IN BLOOD BY WESTERGREN METHOD: 11 MM/HR (ref 0–20)
GLUCOSE SERPL-MCNC: 116 MG/DL (ref 70–99)
GLUCOSE UR STRIP-MCNC: NEGATIVE MG/DL
HBA1C MFR BLD: 7.2 % (ref 4–6.2)
HCT VFR BLD AUTO: 37.1 % (ref 40–53)
HDLC SERPL-MCNC: 53 MG/DL
HGB BLD-MCNC: 12.4 G/DL (ref 13.3–17.7)
HGB UR QL STRIP: NEGATIVE
HYALINE CASTS: 1 /LPF
IRON BINDING CAPACITY (ROCHE): 321 UG/DL (ref 240–430)
IRON SATN MFR SERPL: 21 % (ref 15–46)
IRON SERPL-MCNC: 69 UG/DL (ref 61–157)
KETONES UR STRIP-MCNC: NEGATIVE MG/DL
LDLC SERPL CALC-MCNC: 64 MG/DL
LEUKOCYTE ESTERASE UR QL STRIP: NEGATIVE
MCH RBC QN AUTO: 31.1 PG (ref 26.5–33)
MCHC RBC AUTO-ENTMCNC: 33.4 G/DL (ref 31.5–36.5)
MCV RBC AUTO: 93 FL (ref 78–100)
MICROALBUMIN UR-MCNC: 23.5 MG/L
MICROALBUMIN/CREAT UR: 16.02 MG/G CR (ref 0–17)
MUCOUS THREADS #/AREA URNS LPF: PRESENT /LPF
NITRATE UR QL: NEGATIVE
NONHDLC SERPL-MCNC: 86 MG/DL
PH UR STRIP: 5.5 [PH] (ref 5–9)
PLATELET # BLD AUTO: 194 10E3/UL (ref 150–450)
POTASSIUM SERPL-SCNC: 4.6 MMOL/L (ref 3.4–5.3)
RBC # BLD AUTO: 3.99 10E6/UL (ref 4.4–5.9)
RBC URINE: <1 /HPF
SODIUM SERPL-SCNC: 142 MMOL/L (ref 136–145)
SP GR UR STRIP: 1.02 (ref 1–1.03)
TRIGL SERPL-MCNC: 110 MG/DL
UROBILINOGEN UR STRIP-MCNC: NORMAL MG/DL
VIT B12 SERPL-MCNC: 824 PG/ML (ref 232–1245)
WBC # BLD AUTO: 5 10E3/UL (ref 4–11)
WBC URINE: <1 /HPF

## 2023-09-22 PROCEDURE — 36415 COLL VENOUS BLD VENIPUNCTURE: CPT | Mod: ZL | Performed by: INTERNAL MEDICINE

## 2023-09-22 PROCEDURE — 86140 C-REACTIVE PROTEIN: CPT | Mod: ZL | Performed by: INTERNAL MEDICINE

## 2023-09-22 PROCEDURE — 81001 URINALYSIS AUTO W/SCOPE: CPT | Mod: ZL | Performed by: INTERNAL MEDICINE

## 2023-09-22 PROCEDURE — G0463 HOSPITAL OUTPT CLINIC VISIT: HCPCS | Mod: 25 | Performed by: INTERNAL MEDICINE

## 2023-09-22 PROCEDURE — 80048 BASIC METABOLIC PNL TOTAL CA: CPT | Mod: ZL | Performed by: INTERNAL MEDICINE

## 2023-09-22 PROCEDURE — 83550 IRON BINDING TEST: CPT | Mod: ZL | Performed by: INTERNAL MEDICINE

## 2023-09-22 PROCEDURE — 99214 OFFICE O/P EST MOD 30 MIN: CPT | Performed by: INTERNAL MEDICINE

## 2023-09-22 PROCEDURE — 85652 RBC SED RATE AUTOMATED: CPT | Mod: ZL | Performed by: INTERNAL MEDICINE

## 2023-09-22 PROCEDURE — 85027 COMPLETE CBC AUTOMATED: CPT | Mod: ZL | Performed by: INTERNAL MEDICINE

## 2023-09-22 PROCEDURE — 83036 HEMOGLOBIN GLYCOSYLATED A1C: CPT | Mod: ZL | Performed by: INTERNAL MEDICINE

## 2023-09-22 PROCEDURE — G0008 ADMIN INFLUENZA VIRUS VAC: HCPCS

## 2023-09-22 PROCEDURE — 82570 ASSAY OF URINE CREATININE: CPT | Mod: ZL | Performed by: INTERNAL MEDICINE

## 2023-09-22 PROCEDURE — 80061 LIPID PANEL: CPT | Mod: ZL | Performed by: INTERNAL MEDICINE

## 2023-09-22 PROCEDURE — 82607 VITAMIN B-12: CPT | Mod: ZL | Performed by: INTERNAL MEDICINE

## 2023-09-22 RX ORDER — TAMSULOSIN HYDROCHLORIDE 0.4 MG/1
0.4 CAPSULE ORAL DAILY
Qty: 90 CAPSULE | Refills: 3 | Status: SHIPPED | OUTPATIENT
Start: 2023-09-22 | End: 2024-05-02

## 2023-09-22 RX ORDER — GLIPIZIDE 5 MG/1
5 TABLET ORAL DAILY
Qty: 90 TABLET | Refills: 3 | Status: SHIPPED | OUTPATIENT
Start: 2023-09-22 | End: 2024-05-02

## 2023-09-22 RX ORDER — RAMIPRIL 10 MG/1
10 CAPSULE ORAL DAILY
Qty: 90 CAPSULE | Refills: 3 | Status: SHIPPED | OUTPATIENT
Start: 2023-09-22

## 2023-09-22 RX ORDER — SIMVASTATIN 20 MG
20 TABLET ORAL AT BEDTIME
Qty: 90 TABLET | Refills: 3 | Status: SHIPPED | OUTPATIENT
Start: 2023-09-22

## 2023-09-22 RX ORDER — FERROUS SULFATE 325(65) MG
325 TABLET ORAL
Qty: 90 TABLET | Refills: 3 | Status: SHIPPED | OUTPATIENT
Start: 2023-09-22

## 2023-09-22 RX ORDER — LANOLIN ALCOHOL/MO/W.PET/CERES
1000 CREAM (GRAM) TOPICAL DAILY
Qty: 90 TABLET | Refills: 4 | Status: SHIPPED | OUTPATIENT
Start: 2023-09-22

## 2023-09-22 RX ORDER — PREDNISONE 1 MG/1
4 TABLET ORAL DAILY
Qty: 90 TABLET | Refills: 3 | Status: SHIPPED | OUTPATIENT
Start: 2023-09-22 | End: 2024-05-02

## 2023-09-22 RX ORDER — PREDNISONE 5 MG/1
TABLET ORAL
COMMUNITY
Start: 2023-06-27 | End: 2023-09-22

## 2023-09-22 ASSESSMENT — PAIN SCALES - GENERAL: PAINLEVEL: NO PAIN (0)

## 2023-09-22 NOTE — PATIENT INSTRUCTIONS
-- Taper prednisone by 1 mg per month starting at 4 mg   -- Okay to stop when you are on 3 mg or less   -- If inflammatory markers are elevated or major symptoms recur with stopping prednisone, keep Rheumatology consult     -- If anemia persists would recommend colonoscopy     -- Flu today   -- COVID and RSV when available    Aspects of Diabetes we can improve:  Hemoglobin A1c Lab Results   Component Value Date    A1C 7.1 04/17/2023    A1C 8.3 08/19/2022    A1C 7.3 10/18/2021    A1C 7.1 11/09/2020    A1C 7.0 11/08/2019    A1C 7.1 11/08/2018    Goal range is under 8. Best is 6.5 to 7   Blood Pressure 128/76 Goal to keep less than 140/90   Tobacco  reports that he has never smoked. He has never used smokeless tobacco. Goal to abstain from tobacco   Aspirin yes Aspirin reduces risk of heart disease and stroke   ACE/ARB yes These medications reduce risk of kidney disease   Cholesterol yes Statins reduce risk of heart disease and stroke   Eye Exam annual Annual diabetic eye exam   Healthy weight Body mass index is 29.37 kg/m . Goal BMI under 30, best is under 25.      -- I'm trying to exercise daily (goal at least 20 min/day) with moderate aerobic activity   -- Eat healthy (resources from ADA at http://www.diabetes.org/)   -- I'm taking good care of my feet. Consider seeing the Podiatrist   -- Check blood sugars as directed, record in log book and bring to every appointment   -- Goal sugar before breakfast: under 140   -- Goal sugar 2 hours after supper: under 170   -- Next diabetes lab draw: 3-6 months   -- Next diabetes office visit: 3-6 months

## 2023-09-22 NOTE — NURSING NOTE
"Chief Complaint   Patient presents with    Diabetes         Initial /76   Pulse 74   Temp 98.6  F (37  C) (Tympanic)   Resp 16   Ht 1.734 m (5' 8.25\")   Wt 88.3 kg (194 lb 9.6 oz)   SpO2 98%   BMI 29.37 kg/m   Estimated body mass index is 29.37 kg/m  as calculated from the following:    Height as of this encounter: 1.734 m (5' 8.25\").    Weight as of this encounter: 88.3 kg (194 lb 9.6 oz).         Norma J. Gosselin, LPN     "

## 2023-09-22 NOTE — PROGRESS NOTES
Assessment & Plan   1. Type 2 diabetes mellitus without complication, without long-term current use of insulin (H)  At goal, no change.  - blood glucose (NO BRAND SPECIFIED) lancets standard; Use to test blood sugar 2 time daily. On Prednisone, elevated A1c.  Please dispense what is covered by insurance.  Dispense: 100 each; Refill: 11  - blood glucose (NO BRAND SPECIFIED) test strip; Use to test blood sugar 2 time daily. On Prednisone, elevated A1c.  Please dispense item covered by insurance.  Dispense: 100 strip; Refill: 11  - blood glucose monitoring (NO BRAND SPECIFIED) meter device kit; Use to test blood sugar 2 time daily.  On Prednisone, elevated A1c. Please dispense item covered by insurance.  Dispense: 1 kit; Refill: 1  - glipiZIDE (GLUCOTROL) 5 MG tablet; Take 1 tablet (5 mg) by mouth daily  Dispense: 90 tablet; Refill: 3  - metFORMIN (GLUCOPHAGE) 500 MG tablet; Take 2 tablets (1,000 mg) by mouth 2 times daily (with meals)  Dispense: 360 tablet; Refill: 3  - simvastatin (ZOCOR) 20 MG tablet; Take 1 tablet (20 mg) by mouth At Bedtime  Dispense: 90 tablet; Refill: 3  - Hemoglobin A1c; Future  - Basic metabolic panel; Future  - Albumin Random Urine Quantitative with Creat Ratio; Future    2. Anemia, normocytic normochromic  Etiology of anemia is uncertain at this time.  If anemia has not corrected with replacement of iron and vitamin B12 would consider causes of occult anemia including but not limited to colon cancer, etc.  We discussed options and decided to obtain additional testing today with a urinalysis looking for microscopic hematuria.  We will have a low threshold for upper and lower endoscopies.  - cyanocobalamin (VITAMIN B-12) 1000 MCG tablet; Take 1 tablet (1,000 mcg) by mouth daily  Dispense: 90 tablet; Refill: 4  - ferrous sulfate (FEROSUL) 325 (65 Fe) MG tablet; Take 1 tablet (325 mg) by mouth daily (with breakfast)  Dispense: 90 tablet; Refill: 3  - Iron & Iron Binding Capacity; Future  -  Vitamin B12; Future  - UA with Microscopic    3. Essential (primary) hypertension  At goal, no change.  - ramipril (ALTACE) 10 MG capsule; Take 1 capsule (10 mg) by mouth daily  Dispense: 90 capsule; Refill: 3  - Basic metabolic panel; Future    4. Mixed hyperlipidemia  At goal, no change.  - simvastatin (ZOCOR) 20 MG tablet; Take 1 tablet (20 mg) by mouth At Bedtime  Dispense: 90 tablet; Refill: 3  - Lipid Profile; Future    5. Benign prostatic hyperplasia with urinary frequency  At goal, no change.  - tamsulosin (FLOMAX) 0.4 MG capsule; Take 1 capsule (0.4 mg) by mouth daily  Dispense: 90 capsule; Refill: 3    6. PMR (polymyalgia rheumatica) (H)  He had a recurrence of knee and shoulder pain after discontinuing prednisone.  He described being quite active helping his son around that time.  It is unclear to me if this was a recurrence of polymyalgia symptoms versus acute on chronic osteoarthritis.  He has had elevated inflammatory markers throughout.  Recommend continuation of the taper.  If inflammatory markers have normalized and symptoms continue to be resolved I believe he can cancel his rheumatology consultation however if concerns persist would recommend being the consultation appointment in November as scheduled.  - predniSONE (DELTASONE) 1 MG tablet; Take 4 tablets (4 mg) by mouth daily  Dispense: 90 tablet; Refill: 3  - Erythrocyte sedimentation rate auto; Future  - CRP inflammation; Future      Patient Instructions    -- Taper prednisone by 1 mg per month starting at 4 mg   -- Okay to stop when you are on 3 mg or less   -- If inflammatory markers are elevated or major symptoms recur with stopping prednisone, keep Rheumatology consult     -- If anemia persists would recommend colonoscopy     -- Flu today   -- COVID and RSV when available    Aspects of Diabetes we can improve:  Hemoglobin A1c Lab Results   Component Value Date    A1C 7.1 04/17/2023    A1C 8.3 08/19/2022    A1C 7.3 10/18/2021    A1C 7.1  "11/09/2020    A1C 7.0 11/08/2019    A1C 7.1 11/08/2018    Goal range is under 8. Best is 6.5 to 7   Blood Pressure 128/76 Goal to keep less than 140/90   Tobacco  reports that he has never smoked. He has never used smokeless tobacco. Goal to abstain from tobacco   Aspirin yes Aspirin reduces risk of heart disease and stroke   ACE/ARB yes These medications reduce risk of kidney disease   Cholesterol yes Statins reduce risk of heart disease and stroke   Eye Exam annual Annual diabetic eye exam   Healthy weight Body mass index is 29.37 kg/m . Goal BMI under 30, best is under 25.      -- I'm trying to exercise daily (goal at least 20 min/day) with moderate aerobic activity   -- Eat healthy (resources from ADA at http://www.diabetes.org/)   -- I'm taking good care of my feet. Consider seeing the Podiatrist   -- Check blood sugars as directed, record in log book and bring to every appointment   -- Goal sugar before breakfast: under 140   -- Goal sugar 2 hours after supper: under 170   -- Next diabetes lab draw: 3-6 months   -- Next diabetes office visit: 3-6 months          Return in about 3 months (around 12/22/2023), or if symptoms worsen or fail to improve, for med management, medicare wellness visit.    Signed, Deven Canseco MD, FAAP, FACP  Internal Medicine & Pediatrics    Subjective   Rosalio Pierre is a 67 year old male who presents for diabetes follow-up.  He is still working on his prednisone.  He has polymyalgia rheumatica.  He had stopped prednisone after 5 mg and his symptoms recurred.  He had been off of prednisone from April to August.  He resumed prednisone 5 mg and continues on that to this day.  He has a rheumatology appointment scheduled for November.  He has been able to control his diabetes despite the prednisone.  He wants to know if he needs to continue taking his iron.    Objective   Vitals: /76   Pulse 74   Temp 98.6  F (37  C) (Tympanic)   Resp 16   Ht 1.734 m (5' 8.25\")   Wt 88.3 kg " (194 lb 9.6 oz)   SpO2 98%   BMI 29.37 kg/m          Review and Analysis of Data   I personally reviewed the following:  External notes: No  Results: Yes local laboratory data reviewed  Use of an independent historian: No  Independent review of a test performed by another physician: No  Discussion of management with another physician: No  Moderate risk of morbidity from additional diagnostic testing and/or treatment.

## 2023-09-22 NOTE — Clinical Note
Please abstract the following data from this visit with this patient into the appropriate field in Epic:  Tests that can be patient reported without a hard copy:  Eye exam with ophthalmology on this date: 9/21/23  Exam Location: [facility] [place]- patient reported  Other Tests found in the patient's chart through Chart Review/Care Everywhere:  Eye exam with ophthalmology done by this group [facility] [place] on this date: 9/21/23- patient reported  Note to Abstraction: If this section is blank, no results were found via Chart Review/Care Everywhere.

## 2023-09-24 DIAGNOSIS — E11.9 TYPE 2 DIABETES MELLITUS WITHOUT COMPLICATION, WITHOUT LONG-TERM CURRENT USE OF INSULIN (H): ICD-10-CM

## 2023-10-02 NOTE — TELEPHONE ENCOUNTER
Denying, duplicate request    Last Prescription Date: 9/22/2023  Last Qty/Refills: 100 / R-11  Last Office Visit: 9/22/2023  Future Office Visit: None    Yelena Huerta RN on 10/2/2023 at 12:27 PM     Requested Prescriptions   Pending Prescriptions Disp Refills    CONTOUR NEXT TEST test strip [Pharmacy Med Name: Contour Next Test In Vitro Strip]  0     Sig: USE  STRIP TO CHECK GLUCOSE TWICE DAILY ON  PREDNISONE  ELEVATED  A1C.       Diabetic Supplies Protocol Passed - 9/24/2023 11:09 AM

## 2023-10-08 ENCOUNTER — HEALTH MAINTENANCE LETTER (OUTPATIENT)
Age: 68
End: 2023-10-08

## 2023-12-21 DIAGNOSIS — E11.9 TYPE 2 DIABETES MELLITUS WITHOUT COMPLICATION, WITHOUT LONG-TERM CURRENT USE OF INSULIN (H): ICD-10-CM

## 2023-12-21 NOTE — TELEPHONE ENCOUNTER
[facility]-Reason for call: Medication or medication refill    Name of medication requested: test strips    How many days of medication do you have left? 5    What pharmacy do you use? A [facility] in [facility] [address]    Preferred method for responding to this message: Telephone Call    Phone number patient can be reached at: Home number on file [phone] (home)    If we cannot reach you directly, may we leave a detailed response at the number you provided? Yes

## 2023-12-21 NOTE — TELEPHONE ENCOUNTER
blood glucose (NO BRAND SPECIFIED) test strip 100 strip 11 9/22/2023 -- No   Sig: Use to test blood sugar 2 time daily. On Prednisone, elevated A1c.  Please dispense item covered by insurance.   Class: E-Prescribe   Order: [id number]   Prior authorization: Payer Waiting for Response   This order has not been released to its destination.     [facility] [address] [place] [address] prescription sent to [facility] per original written order. Anna Joshua RN .............. 12/21/2023  8:28 AM

## 2023-12-23 LAB — NONINV COLON CA DNA+OCC BLD SCRN STL QL: NEGATIVE

## 2024-04-29 SDOH — HEALTH STABILITY: PHYSICAL HEALTH: ON AVERAGE, HOW MANY MINUTES DO YOU ENGAGE IN EXERCISE AT THIS LEVEL?: 20 MIN

## 2024-04-29 SDOH — HEALTH STABILITY: PHYSICAL HEALTH: ON AVERAGE, HOW MANY DAYS PER WEEK DO YOU ENGAGE IN MODERATE TO STRENUOUS EXERCISE (LIKE A BRISK WALK)?: 5 DAYS

## 2024-04-29 ASSESSMENT — SOCIAL DETERMINANTS OF HEALTH (SDOH): HOW OFTEN DO YOU GET TOGETHER WITH FRIENDS OR RELATIVES?: TWICE A WEEK

## 2024-05-02 ENCOUNTER — OFFICE VISIT (OUTPATIENT)
Dept: PEDIATRICS | Facility: OTHER | Age: 69
End: 2024-05-02
Attending: INTERNAL MEDICINE
Payer: COMMERCIAL

## 2024-05-02 VITALS
BODY MASS INDEX: 28.53 KG/M2 | DIASTOLIC BLOOD PRESSURE: 80 MMHG | WEIGHT: 192.6 LBS | HEART RATE: 72 BPM | OXYGEN SATURATION: 97 % | HEIGHT: 69 IN | RESPIRATION RATE: 16 BRPM | TEMPERATURE: 97.7 F | SYSTOLIC BLOOD PRESSURE: 130 MMHG

## 2024-05-02 DIAGNOSIS — M35.3 PMR (POLYMYALGIA RHEUMATICA) (H): ICD-10-CM

## 2024-05-02 DIAGNOSIS — Z00.00 ENCOUNTER FOR MEDICARE ANNUAL WELLNESS EXAM: Primary | ICD-10-CM

## 2024-05-02 DIAGNOSIS — R35.0 BENIGN PROSTATIC HYPERPLASIA WITH URINARY FREQUENCY: ICD-10-CM

## 2024-05-02 DIAGNOSIS — R97.20 ELEVATED PROSTATE SPECIFIC ANTIGEN (PSA): ICD-10-CM

## 2024-05-02 DIAGNOSIS — N40.1 BENIGN PROSTATIC HYPERPLASIA WITH URINARY FREQUENCY: ICD-10-CM

## 2024-05-02 DIAGNOSIS — E11.9 TYPE 2 DIABETES MELLITUS WITHOUT COMPLICATION, WITHOUT LONG-TERM CURRENT USE OF INSULIN (H): Chronic | ICD-10-CM

## 2024-05-02 DIAGNOSIS — D64.9 NORMOCYTIC ANEMIA: ICD-10-CM

## 2024-05-02 LAB
ANION GAP SERPL CALCULATED.3IONS-SCNC: 10 MMOL/L (ref 7–15)
BUN SERPL-MCNC: 19.4 MG/DL (ref 8–23)
CALCIUM SERPL-MCNC: 9.5 MG/DL (ref 8.8–10.2)
CHLORIDE SERPL-SCNC: 106 MMOL/L (ref 98–107)
CREAT SERPL-MCNC: 1.17 MG/DL (ref 0.67–1.17)
DEPRECATED HCO3 PLAS-SCNC: 25 MMOL/L (ref 22–29)
EGFRCR SERPLBLD CKD-EPI 2021: 67 ML/MIN/1.73M2
ERYTHROCYTE [DISTWIDTH] IN BLOOD BY AUTOMATED COUNT: 13.5 % (ref 10–15)
GLUCOSE SERPL-MCNC: 139 MG/DL (ref 70–99)
HBA1C MFR BLD: 6.7 % (ref 4–6.2)
HCT VFR BLD AUTO: 36.9 % (ref 40–53)
HGB BLD-MCNC: 12.2 G/DL (ref 13.3–17.7)
MCH RBC QN AUTO: 30.6 PG (ref 26.5–33)
MCHC RBC AUTO-ENTMCNC: 33.1 G/DL (ref 31.5–36.5)
MCV RBC AUTO: 93 FL (ref 78–100)
PLATELET # BLD AUTO: 164 10E3/UL (ref 150–450)
POTASSIUM SERPL-SCNC: 4.9 MMOL/L (ref 3.4–5.3)
PSA SERPL DL<=0.01 NG/ML-MCNC: 5.35 NG/ML (ref 0–4.5)
RBC # BLD AUTO: 3.99 10E6/UL (ref 4.4–5.9)
SODIUM SERPL-SCNC: 141 MMOL/L (ref 135–145)
WBC # BLD AUTO: 4.6 10E3/UL (ref 4–11)

## 2024-05-02 PROCEDURE — 80048 BASIC METABOLIC PNL TOTAL CA: CPT | Mod: ZL | Performed by: INTERNAL MEDICINE

## 2024-05-02 PROCEDURE — 36415 COLL VENOUS BLD VENIPUNCTURE: CPT | Mod: ZL | Performed by: INTERNAL MEDICINE

## 2024-05-02 PROCEDURE — 84153 ASSAY OF PSA TOTAL: CPT | Mod: ZL | Performed by: INTERNAL MEDICINE

## 2024-05-02 PROCEDURE — 85027 COMPLETE CBC AUTOMATED: CPT | Mod: ZL | Performed by: INTERNAL MEDICINE

## 2024-05-02 PROCEDURE — 99214 OFFICE O/P EST MOD 30 MIN: CPT | Mod: 25 | Performed by: INTERNAL MEDICINE

## 2024-05-02 PROCEDURE — G0439 PPPS, SUBSEQ VISIT: HCPCS | Performed by: INTERNAL MEDICINE

## 2024-05-02 PROCEDURE — 90480 ADMN SARSCOV2 VAC 1/ONLY CMP: CPT

## 2024-05-02 PROCEDURE — 83036 HEMOGLOBIN GLYCOSYLATED A1C: CPT | Mod: ZL | Performed by: INTERNAL MEDICINE

## 2024-05-02 PROCEDURE — 99207 PR FOOT EXAM NO CHARGE: CPT | Performed by: INTERNAL MEDICINE

## 2024-05-02 PROCEDURE — G0463 HOSPITAL OUTPT CLINIC VISIT: HCPCS

## 2024-05-02 PROCEDURE — G0463 HOSPITAL OUTPT CLINIC VISIT: HCPCS | Mod: 25

## 2024-05-02 RX ORDER — GLIPIZIDE 5 MG/1
TABLET ORAL
Qty: 135 TABLET | Refills: 4 | Status: SHIPPED | OUTPATIENT
Start: 2024-05-02

## 2024-05-02 RX ORDER — TAMSULOSIN HYDROCHLORIDE 0.4 MG/1
0.8 CAPSULE ORAL DAILY
Qty: 180 CAPSULE | Refills: 4 | Status: SHIPPED | OUTPATIENT
Start: 2024-05-02

## 2024-05-02 ASSESSMENT — PAIN SCALES - GENERAL: PAINLEVEL: NO PAIN (0)

## 2024-05-02 NOTE — PATIENT INSTRUCTIONS
Aspects of Diabetes we can improve:  Hemoglobin A1c Lab Results   Component Value Date    A1C 7.2 09/22/2023    A1C 7.1 04/17/2023    A1C 8.3 08/19/2022    A1C 7.3 10/18/2021    A1C 7.1 11/09/2020    A1C 7.0 11/08/2019    A1C 7.1 11/08/2018    Goal range is under 8. Best is 6.5 to 7   Blood Pressure 130/80 Goal to keep less than 140/90   Tobacco  reports that he has never smoked. He has never used smokeless tobacco. Goal to abstain from tobacco   Aspirin yes Aspirin reduces risk of heart disease and stroke   ACE/ARB ramipril These medications reduce risk of kidney disease   Cholesterol simvastatin Statins reduce risk of heart disease and stroke   Eye Exam annual Annual diabetic eye exam   Healthy weight Body mass index is 28.86 kg/m . Goal BMI under 30, best is under 25.      -- I'm trying to exercise daily (goal at least 20 min/day) with moderate aerobic activity   -- Eat healthy (resources from ADA at http://www.diabetes.org/)   -- I'm taking good care of my feet. Consider seeing the Podiatrist   -- Check blood sugars as directed, record in log book and bring to every appointment   -- Goal sugar before breakfast: under 140   -- Goal sugar 2 hours after supper: under 170   -- Next diabetes lab draw: 6 months   -- Next diabetes office visit: 6 months  Patient Education   Preventive Care Advice   This is general advice given by our system to help you stay healthy. However, your care team may have specific advice just for you. Please talk to your care team about your preventive care needs.  Nutrition  Eat 5 or more servings of fruits and vegetables each day.  Try wheat bread, brown rice and whole grain pasta (instead of white bread, rice, and pasta).  Get enough calcium and vitamin D. Check the label on foods and aim for 100% of the RDA (recommended daily allowance).  Lifestyle  Exercise at least 150 minutes each week   (30 minutes a day, 5 days a week).  Do muscle strengthening activities 2 days a week. These help  control your weight and prevent disease.  No smoking.  Wear sunscreen to prevent skin cancer.  Have a dental exam and cleaning every 6 months.  Yearly exams  See your health care team every year to talk about:  Any changes in your health.  Any medicines your care team has prescribed.  Preventive care, family planning, and ways to prevent chronic diseases.  Shots (vaccines)   HPV shots (up to age 26), if you've never had them before.  Hepatitis B shots (up to age 59), if you've never had them before.  COVID-19 shot: Get this shot when it's due.  Flu shot: Get a flu shot every year.  Tetanus shot: Get a tetanus shot every 10 years.  Pneumococcal, hepatitis A, and RSV shots: Ask your care team if you need these based on your risk.  Shingles shot (for age 50 and up).  General health tests  Diabetes screening:  Starting at age 35, Get screened for diabetes at least every 3 years.  If you are younger than age 35, ask your care team if you should be screened for diabetes.  Cholesterol test: At age 39, start having a cholesterol test every 5 years, or more often if advised.  Bone density scan (DEXA): At age 50, ask your care team if you should have this scan for osteoporosis (brittle bones).  Hepatitis C: Get tested at least once in your life.  STIs (sexually transmitted infections)  Before age 24: Ask your care team if you should be screened for STIs.  After age 24: Get screened for STIs if you're at risk. You are at risk for STIs (including HIV) if:  You are sexually active with more than one person.  You don't use condoms every time.  You or a partner was diagnosed with a sexually transmitted infection.  If you are at risk for HIV, ask about PrEP medicine to prevent HIV.  Get tested for HIV at least once in your life, whether you are at risk for HIV or not.  Cancer screening tests  Cervical cancer screening: If you have a cervix, begin getting regular cervical cancer screening tests at age 21. Most people who have  regular screenings with normal results can stop after age 65. Talk about this with your provider.  Breast cancer scan (mammogram): If you've ever had breasts, begin having regular mammograms starting at age 40. This is a scan to check for breast cancer.  Colon cancer screening: It is important to start screening for colon cancer at age 45.  Have a colonoscopy test every 10 years (or more often if you're at risk) Or, ask your provider about stool tests like a FIT test every year or Cologuard test every 3 years.  To learn more about your testing options, visit: https://www.[employer]/772607.pdf.  For help making a decision, visit: https://bit.ly/tu40216.  Prostate cancer screening test: If you have a prostate and are age 55 to 69, ask your provider if you would benefit from a yearly prostate cancer screening test.  Lung cancer screening: If you are a current or former smoker age 50 to 80, ask your care team if ongoing lung cancer screenings are right for you.  For informational purposes only. Not to replace the advice of your health care provider. Copyright   2023 [employer]. All rights reserved. Clinically reviewed by the [facility] Transitions Program. [employer] 550002 - REV 01/24.    Hearing Loss: Care Instructions  Overview     Hearing loss is a sudden or slow decrease in how well you hear. It can range from slight to profound. Permanent hearing loss can occur with aging. It also can happen when you are exposed long-term to loud noise. Examples include listening to loud music, riding motorcycles, or being around other loud machines.  Hearing loss can affect your work and home life. It can make you feel lonely or depressed. You may feel that you have lost your independence. But hearing aids and other devices can help you hear better and feel connected to others.  Follow-up care is a key part of your treatment and safety. Be sure to make and go to all appointments, and call your doctor if you  are having problems. It's also a good idea to know your test results and keep a list of the medicines you take.  How can you care for yourself at home?  Avoid loud noises whenever possible. This helps keep your hearing from getting worse.  Always wear hearing protection around loud noises.  Wear a hearing aid as directed.  A professional can help you pick a hearing aid that will work best for you.  You can also get hearing aids over the counter for mild to moderate hearing loss.  Have hearing tests as your doctor suggests. They can show whether your hearing has changed. Your hearing aid may need to be adjusted.  Use other devices as needed. These may include:  Telephone amplifiers and hearing aids that can connect to a television, stereo, radio, or microphone.  Devices that use lights or vibrations. These alert you to the doorbell, a ringing telephone, or a baby monitor.  Television closed-captioning. This shows the words at the bottom of the screen. Most new TVs can do this.  TTY (text telephone). This lets you type messages back and forth on the telephone instead of talking or listening. These devices are also called TDD. When messages are typed on the keyboard, they are sent over the phone line to a receiving TTY. The message is shown on a monitor.  Use text messaging, social media, and email if it is hard for you to communicate by telephone.  Try to learn a listening technique called speechreading. It is not lipreading. You pay attention to people's gestures, expressions, posture, and tone of voice. These clues can help you understand what a person is saying. Face the person you are talking to, and have them face you. Make sure the lighting is good. You need to see the other person's face clearly.  Think about counseling if you need help to adjust to your hearing loss.  When should you call for help?  Watch closely for changes in your health, and be sure to contact your doctor if:    You think your hearing is  "getting worse.     You have new symptoms, such as dizziness or nausea.   Where can you learn more?  Go to https://www."[employer]net/patiented  Enter R798 in the search box to learn more about \"Hearing Loss: Care Instructions.\"  Current as of: September 27, 2023               Content Version: 14.0    4842-7414 [employer].   Care instructions adapted under license by your healthcare professional. If you have questions about a medical condition or this instruction, always ask your healthcare professional. [employer] disclaims any warranty or liability for your use of this information.      Learning About Stress  What is stress?     Stress is your body's response to a hard situation. Your body can have a physical, emotional, or mental response. Stress is a fact of life for most people, and it affects everyone differently. What causes stress for you may not be stressful for someone else.  A lot of things can cause stress. You may feel stress when you go on a job interview, take a test, or run a race. This kind of short-term stress is normal and even useful. It can help you if you need to work hard or react quickly. For example, stress can help you finish an important job on time.  Long-term stress is caused by ongoing stressful situations or events. Examples of long-term stress include long-term health problems, ongoing problems at work, or conflicts in your family. Long-term stress can harm your health.  How does stress affect your health?  When you are stressed, your body responds as though you are in danger. It makes hormones that speed up your heart, make you breathe faster, and give you a burst of energy. This is called the fight-or-flight stress response. If the stress is over quickly, your body goes back to normal and no harm is done.  But if stress happens too often or lasts too long, it can have bad effects. Long-term stress can make you more likely to get sick, and it can make " symptoms of some diseases worse. If you tense up when you are stressed, you may develop neck, shoulder, or low back pain. Stress is linked to high blood pressure and heart disease.  Stress also harms your emotional health. It can make you mckeon, tense, or depressed. Your relationships may suffer, and you may not do well at work or school.  What can you do to manage stress?  You can try these things to help manage stress:   Do something active. Exercise or activity can help reduce stress. Walking is a great way to get started. Even everyday activities such as housecleaning or yard work can help.  Try yoga or rosa chi. These techniques combine exercise and meditation. You may need some training at first to learn them.  Do something you enjoy. For example, listen to music or go to a movie. Practice your hobby or do volunteer work.  Meditate. This can help you relax, because you are not worrying about what happened before or what may happen in the future.  Do guided imagery. Imagine yourself in any setting that helps you feel calm. You can use online videos, books, or a teacher to guide you.  Do breathing exercises. For example:  From a standing position, bend forward from the waist with your knees slightly bent. Let your arms dangle close to the floor.  Breathe in slowly and deeply as you return to a standing position. Roll up slowly and lift your head last.  Hold your breath for just a few seconds in the standing position.  Breathe out slowly and bend forward from the waist.  Let your feelings out. Talk, laugh, cry, and express anger when you need to. Talking with supportive friends or family, a counselor, or a ade leader about your feelings is a healthy way to relieve stress. Avoid discussing your feelings with people who make you feel worse.  Write. It may help to write about things that are bothering you. This helps you find out how much stress you feel and what is causing it. When you know this, you can find  "better ways to cope.  What can you do to prevent stress?  You might try some of these things to help prevent stress:  Manage your time. This helps you find time to do the things you want and need to do.  Get enough sleep. Your body recovers from the stresses of the day while you are sleeping.  Get support. Your family, friends, and community can make a difference in how you experience stress.  Limit your news feed. Avoid or limit time on social media or news that may make you feel stressed.  Do something active. Exercise or activity can help reduce stress. Walking is a great way to get started.  Where can you learn more?  Go to https://www.[employer].net/patiented  Enter N032 in the search box to learn more about \"Learning About Stress.\"  Current as of: October 24, 2023               Content Version: 14.0    6986-9038 [employer].   Care instructions adapted under license by your healthcare professional. If you have questions about a medical condition or this instruction, always ask your healthcare professional. [employer] disclaims any warranty or liability for your use of this information.      Bladder Training: Care Instructions  Your Care Instructions     Bladder training is used to treat urge incontinence and stress incontinence. Urge incontinence means that the need to urinate comes on so fast that you can't get to a toilet in time. Stress incontinence means that you leak urine because of pressure on your bladder. For example, it may happen when you laugh, cough, or lift something heavy.  Bladder training can increase how long you can wait before you have to urinate. It can also help your bladder hold more urine. And it can give you better control over the urge to urinate.  It is important to remember that bladder training takes a few weeks to a few months to make a difference. You may not see results right away, but don't give up.  Follow-up care is a key part of your treatment and " safety. Be sure to make and go to all appointments, and call your doctor if you are having problems. It's also a good idea to know your test results and keep a list of the medicines you take.  How can you care for yourself at home?  Work with your doctor to come up with a bladder training program that is right for you. You may use one or more of the following methods.  Delayed urination  In the beginning, try to keep from urinating for 5 minutes after you first feel the need to go.  While you wait, take deep, slow breaths to relax. Kegel exercises can also help you delay the need to go to the bathroom.  After some practice, when you can easily wait 5 minutes to urinate, try to wait 10 minutes before you urinate.  Slowly increase the waiting period until you are able to control when you have to urinate.  Scheduled urination  Empty your bladder when you first wake up in the morning.  Schedule times throughout the day when you will urinate.  Start by going to the bathroom every hour, even if you don't need to go.  Slowly increase the time between trips to the bathroom.  When you have found a schedule that works well for you, keep doing it.  If you wake up during the night and have to urinate, do it. Apply your schedule to waking hours only.  Kegel exercises  These tighten and strengthen pelvic muscles, which can help you control the flow of urine. (If doing these exercises causes pain, stop doing them and talk with your doctor.) To do Kegel exercises:  Squeeze your muscles as if you were trying not to pass gas. Or squeeze your muscles as if you were stopping the flow of urine. Your belly, legs, and buttocks shouldn't move.  Hold the squeeze for 3 seconds, then relax for 5 to 10 seconds.  Start with 3 seconds, then add 1 second each week until you are able to squeeze for 10 seconds.  Repeat the exercise 10 times a session. Do 3 to 8 sessions a day.  When should you call for help?  Watch closely for changes in your  "health, and be sure to contact your doctor if:    Your incontinence is getting worse.     You do not get better as expected.   Where can you learn more?  Go to https://www.[employer].net/patiented  Enter V684 in the search box to learn more about \"Bladder Training: Care Instructions.\"  Current as of: November 15, 2023               Content Version: 14.0    8410-1032 [employer].   Care instructions adapted under license by your healthcare professional. If you have questions about a medical condition or this instruction, always ask your healthcare professional. [employer] disclaims any warranty or liability for your use of this information.      "

## 2024-05-02 NOTE — NURSING NOTE
"Chief Complaint   Patient presents with    Medicare Visit       Initial /80   Pulse 72   Temp 97.7  F (36.5  C) (Tympanic)   Resp 16   Ht 1.74 m (5' 8.5\")   Wt 87.4 kg (192 lb 9.6 oz)   SpO2 97%   BMI 28.86 kg/m   Estimated body mass index is 28.86 kg/m  as calculated from the following:    Height as of this encounter: 1.74 m (5' 8.5\").    Weight as of this encounter: 87.4 kg (192 lb 9.6 oz).  Medication Review: complete    The next two questions are to help us understand your food security.  If you are feeling you need any assistance in this area, we have resources available to support you today.          4/29/2024   SDOH- Food Insecurity   Within the past 12 months, did you worry that your food would run out before you got money to buy more? N   Within the past 12 months, did the food you bought just not last and you didn t have money to get more? N         Health Care Directive:  Patient does not have a Health Care Directive or Living Will: Discussed advance care planning with patient; however, patient declined at this time.    Norma J. Gosselin, LPN      "

## 2024-05-02 NOTE — PROGRESS NOTES
Preventive Care Visit  [facility] AND [facility]  Deven Canseco MD, Internal Medicine  May 2, 2024      1. Encounter for Medicare annual wellness exam    2. Type 2 diabetes mellitus without complication, without long-term current use of insulin (H)  He increased his glipizide by adding a 2.5 dose at supper which improved his blood sugars to the 110-130 range.  No change to current regimen.  - FOOT EXAM  - glipiZIDE (GLUCOTROL) 5 MG tablet; 5 mg with breakfast, 2.5 mg with supper  Dispense: 135 tablet; Refill: 4  - Hemoglobin A1c; Future  - Basic metabolic panel; Future  - Hemoglobin A1c  - Basic metabolic panel    3. PMR (polymyalgia rheumatica) (H24)  Symptoms have resolved after tapering off of prednisone    4. Benign prostatic hyperplasia with urinary frequency  5. Elevated prostate specific antigen (PSA)  He does continue to have some symptoms of benign prostate enlargement.  Recommend repeat PSA.  Increase tamsulosin.  Low threshold for urology consultation.  - Prostate Specific Antigen; Future  - tamsulosin (FLOMAX) 0.4 MG capsule; Take 2 capsules (0.8 mg) by mouth daily  Dispense: 180 capsule; Refill: 4  - Prostate Specific Antigen    6. Normocytic anemia  Had normal B12 and iron levels in the past.  If downtrending would consider colonoscopy, bone marrow biopsy, peripheral smear.  - CBC with platelets; Future  - CBC with platelets    Patient Instructions   Aspects of Diabetes we can improve:  Hemoglobin A1c Lab Results   Component Value Date    A1C 7.2 09/22/2023    A1C 7.1 04/17/2023    A1C 8.3 08/19/2022    A1C 7.3 10/18/2021    A1C 7.1 11/09/2020    A1C 7.0 11/08/2019    A1C 7.1 11/08/2018    Goal range is under 8. Best is 6.5 to 7   Blood Pressure 130/80 Goal to keep less than 140/90   Tobacco  reports that he has never smoked. He has never used smokeless tobacco. Goal to abstain from tobacco   Aspirin yes Aspirin reduces risk of heart disease and stroke   ACE/ARB ramipril These  medications reduce risk of kidney disease   Cholesterol simvastatin Statins reduce risk of heart disease and stroke   Eye Exam annual Annual diabetic eye exam   Healthy weight Body mass index is 28.86 kg/m . Goal BMI under 30, best is under 25.      -- I'm trying to exercise daily (goal at least 20 min/day) with moderate aerobic activity   -- Eat healthy (resources from ADA at http://www.diabetes.org/)   -- I'm taking good care of my feet. Consider seeing the Podiatrist   -- Check blood sugars as directed, record in log book and bring to every appointment   -- Goal sugar before breakfast: under 140   -- Goal sugar 2 hours after supper: under 170   -- Next diabetes lab draw: 6 months   -- Next diabetes office visit: 6 months    Signed, Deven Canseco MD, FAAP, FACP  Internal Medicine & Pediatrics      Subjective   Rosalio is a 68 year old, presenting for the following:  Medicare Visit  Diabetes          Health Care Directive  Patient does not have a Health Care Directive or Living Will: Discussed advance care planning with patient; however, patient declined at this time.    History of Present Illness       Diabetes:   He presents for follow up of diabetes.  He is checking home blood glucose two times daily.   He checks blood glucose at bedtime.  Blood glucose is never over 200 and sometimes under 70. He is aware of hypoglycemia symptoms including lethargy.    He has no concerns regarding his diabetes at this time.   He is not experiencing numbness or burning in feet, excessive thirst, blurry vision, weight changes or redness, sores or blisters on feet.                        4/29/2024   General Health   How would you rate your overall physical health? Good   Feel stress (tense, anxious, or unable to sleep) Only a little   (!) STRESS CONCERN      4/29/2024   Nutrition   Diet: Diabetic    Breakfast skipped         4/29/2024   Exercise   Days per week of moderate/strenous exercise 5 days   Average minutes spent exercising  at this level 20 min         4/29/2024   Social Factors   Frequency of gathering with friends or relatives Twice a week   Worry food won't last until get money to buy more No   Food not last or not have enough money for food? No   Do you have housing?  Yes   Are you worried about losing your housing? No   Lack of transportation? No   Unable to get utilities (heat,electricity)? No         4/29/2024   Fall Risk   Fallen 2 or more times in the past year? No   Trouble with walking or balance? No          4/29/2024   Activities of Daily Living- Home Safety   Needs help with the following daily activites None of the above   Safety concerns in the home None of the above         4/29/2024   Dental   Dentist two times every year? Yes         4/29/2024   Hearing Screening   Hearing concerns? (!) I NEED TO ASK PEOPLE TO SPEAK UP OR REPEAT THEMSELVES.    (!) IT'S HARDER TO UNDERSTAND WOMEN'S VOICES THAN MEN'S VOICES.    (!) IT'S HARD TO FOLLOW A CONVERSATION IN A NOISY RESTAURANT OR CROWDED ROOM.    (!) TROUBLE FOLLOWING DIALOGUE IN THE THEATHER.    (!) TROUBLE UNDERSTANDING SOFT OR WHISPERED SPEECH.         4/29/2024   Driving Risk Screening   Patient/family members have concerns about driving No         4/29/2024   General Alertness/Fatigue Screening   Have you been more tired than usual lately? No         4/29/2024   Urinary Incontinence Screening   Bothered by leaking urine in past 6 months Yes         4/29/2024   TB Screening   Were you born outside of the [place]? No         Today's PHQ-2 Score:       5/2/2024     7:59 AM   PHQ-2 ( 1999 [employer])   Q1: Little interest or pleasure in doing things 0   Q2: Feeling down, depressed or hopeless 0   PHQ-2 Score 0   Q1: Little interest or pleasure in doing things Not at all   Q2: Feeling down, depressed or hopeless Not at all   PHQ-2 Score 0           4/29/2024   Substance Use   Alcohol more than 3/day or more than 7/wk No   Do you have a current opioid prescription? No   How severe/bad  is pain from 1 to 10? 1/10   Do you use any other substances recreationally? No     Social History     Tobacco Use    Smoking status: Never    Smokeless tobacco: Never   Vaping Use    Vaping status: Never Used   Substance Use Topics    Alcohol use: Not Currently     Comment: Alcoholic Drinks/day: Seldom    Drug use: Never           4/29/2024   AAA Screening   Family history of Abdominal Aortic Aneurysm (AAA)? No   Last PSA:   PSA Tumor Marker   Date Value Ref Range Status   05/02/2024 5.35 (H) 0.00 - 4.50 ng/mL Final   09/20/2022 5.54 (H) 0.00 - 4.00 ug/L Final     ASCVD Risk   The 10-year ASCVD risk score (Saran MATUTE, et al., 2019) is: 28.8%    Values used to calculate the score:      Age: 68 years      Sex: Male      Is Non- : No      Diabetic: Yes      Tobacco smoker: No      Systolic Blood Pressure: 130 mmHg      Is BP treated: Yes      HDL Cholesterol: 53 mg/dL      Total Cholesterol: 139 mg/dL            Reviewed and updated as needed this visit by Provider                      Current providers sharing in care for this patient include:  Patient Care Team:  Deven Canseco MD as PCP - General (Pediatrics)  Deven Canseco MD as Assigned PCP    The following health maintenance items are reviewed in Epic and correct as of today:  Health Maintenance   Topic Date Due    EYE EXAM  09/21/2024    LIPID  09/22/2024    MICROALBUMIN  09/22/2024    A1C  11/02/2024    Pneumococcal Vaccine: 65+ Years (3 of 3 - PPSV23 or PCV20) 11/08/2024    MEDICARE ANNUAL WELLNESS VISIT  05/02/2025    BMP  05/02/2025    DIABETIC FOOT EXAM  05/02/2025    FALL RISK ASSESSMENT  05/02/2025    COLORECTAL CANCER SCREENING  12/18/2026    ADVANCE CARE PLANNING  05/02/2029    DTAP/TDAP/TD IMMUNIZATION (2 - Td or Tdap) 05/15/2033    HEPATITIS C SCREENING  Completed    PHQ-2 (once per calendar year)  Completed    INFLUENZA VACCINE  Completed    ZOSTER IMMUNIZATION  Completed    RSV VACCINE (Pregnancy &  "60+)  Completed    COVID-19 Vaccine  Completed    IPV IMMUNIZATION  Aged Out    HPV IMMUNIZATION  Aged Out    MENINGITIS IMMUNIZATION  Aged Out    RSV MONOCLONAL ANTIBODY  Aged Out            Objective    Exam  /80   Pulse 72   Temp 97.7  F (36.5  C) (Tympanic)   Resp 16   Ht 1.74 m (5' 8.5\")   Wt 87.4 kg (192 lb 9.6 oz)   SpO2 97%   BMI 28.86 kg/m     Estimated body mass index is 28.86 kg/m  as calculated from the following:    Height as of this encounter: 1.74 m (5' 8.5\").    Weight as of this encounter: 87.4 kg (192 lb 9.6 oz).    Physical Exam  Gen: Alert, NAD.  Neck: No carotid bruits  CV: RRR no m/r/g  Pulm: CTAB, no w/r/r. No increased work of breathing  Msk: No LE edema  Neuro: Grossly intact  Skin: No concerning lesions.  Psychiatric: Normal affect and insight. Does not appear anxious or depressed.    Foot Exam:  5/2/2024  Intact to monofilament bilaterally.  Skin intact without erythema.    Foot/Ankle Musculoskeletal Exam          5/2/2024   Mini Cog   Clock Draw Score 2 Normal   3 Item Recall 1 object recalled   Mini Cog Total Score 3              Signed Electronically by: Deven Canseco MD    "

## 2024-07-26 ENCOUNTER — TRANSFERRED RECORDS (OUTPATIENT)
Dept: HEALTH INFORMATION MANAGEMENT | Facility: OTHER | Age: 69
End: 2024-07-26
Payer: COMMERCIAL

## 2024-07-26 LAB — RETINOPATHY: NEGATIVE

## 2024-09-12 DIAGNOSIS — E11.9 TYPE 2 DIABETES MELLITUS WITHOUT COMPLICATION, WITHOUT LONG-TERM CURRENT USE OF INSULIN (H): ICD-10-CM

## 2024-09-17 NOTE — TELEPHONE ENCOUNTER
[facility] sent Rx request for the following:      Requested Prescriptions   Pending Prescriptions Disp Refills    metFORMIN (GLUCOPHAGE) 500 MG tablet [Pharmacy Med Name: metFORMIN HCl 500 MG Oral Tablet] 360 tablet 0     Sig: TAKE 2 TABLETS BY MOUTH TWICE DAILY WITH MEALS       Biguanide Agents Passed - 9/17/2024  6:22 PM        Passed - Patient is age 10 or older        Passed - Patient has documented A1c within the specified period of time.     If HgbA1C is 8 or greater, it needs to be on file within the past 3 months.  If less than 8, must be on file within the past 6 months.     Recent Labs   Lab Test 05/02/24  0854 11/08/18  1144 11/06/17  0855   A1C 6.7*   < >  --    [id number]  --   --  6.0    < > = values in this interval not displayed.             Passed - Patient does NOT have a diagnosis of CHF.        Passed - Medication is active on med list        Passed - Medication indicated for associated diagnosis     Medication is associated with one or more of the following diagnoses:     Gestational diabetes mellitus     Hyperinsulinar obesity     Hypersecretion of ovarian androgens    Non-alcoholic fatty liver    Polycystic ovarian syndrome               Pre-diabetes (DM 2 prevention)    Type 2 diabetes mellitus     Weight gain, antipsychotic therapy-induced    Impaired fasting glucose          Passed - Has GFR on file in past 12 months and most recent value is normal        Passed - Recent (6 mo) or future (90 days) visit within the authorizing provider's specialty     The patient must have completed an in-person or virtual visit within the past 6 months or has a future visit scheduled within the next 90 days with the authorizing provider s specialty.  Urgent care and e-visits do not quality as an office visit for this protocol.               Last Prescription Date:   9/22/23  Last Fill Qty/Refills:         390, R-3    Last Office Visit:              5/2/24    Future Office visit:           10/1/24  Next  5 appointments (look out 90 days)      Oct 01, 2024 8:40 AM  (Arrive by 8:25 AM)  Provider Visit with Deven Canseco MD  [facility] ([facility] ) [address]  [place] [address]  [phone]         Prescription approved per [facility] Refill Protocol.    Cal Han RN on 9/17/2024 at 6:23 PM

## 2024-10-01 ENCOUNTER — OFFICE VISIT (OUTPATIENT)
Dept: PEDIATRICS | Facility: OTHER | Age: 69
End: 2024-10-01
Attending: INTERNAL MEDICINE
Payer: MEDICARE

## 2024-10-01 VITALS
SYSTOLIC BLOOD PRESSURE: 120 MMHG | HEART RATE: 70 BPM | RESPIRATION RATE: 16 BRPM | TEMPERATURE: 97.5 F | BODY MASS INDEX: 28.02 KG/M2 | WEIGHT: 189.2 LBS | OXYGEN SATURATION: 96 % | HEIGHT: 69 IN | DIASTOLIC BLOOD PRESSURE: 70 MMHG

## 2024-10-01 DIAGNOSIS — E11.9 TYPE 2 DIABETES MELLITUS WITHOUT COMPLICATION, WITHOUT LONG-TERM CURRENT USE OF INSULIN (H): Primary | ICD-10-CM

## 2024-10-01 DIAGNOSIS — E87.5 HYPERKALEMIA: ICD-10-CM

## 2024-10-01 DIAGNOSIS — I10 ESSENTIAL (PRIMARY) HYPERTENSION: ICD-10-CM

## 2024-10-01 DIAGNOSIS — N17.9 AKI (ACUTE KIDNEY INJURY) (H): ICD-10-CM

## 2024-10-01 DIAGNOSIS — E78.2 MIXED HYPERLIPIDEMIA: ICD-10-CM

## 2024-10-01 LAB
ANION GAP SERPL CALCULATED.3IONS-SCNC: 11 MMOL/L (ref 7–15)
BUN SERPL-MCNC: 31.5 MG/DL (ref 8–23)
CALCIUM SERPL-MCNC: 10 MG/DL (ref 8.8–10.4)
CHLORIDE SERPL-SCNC: 106 MMOL/L (ref 98–107)
CHOLEST SERPL-MCNC: 117 MG/DL
CREAT SERPL-MCNC: 1.37 MG/DL (ref 0.67–1.17)
CREAT UR-MCNC: 112.7 MG/DL
EGFRCR SERPLBLD CKD-EPI 2021: 56 ML/MIN/1.73M2
EST. AVERAGE GLUCOSE BLD GHB EST-MCNC: 146 MG/DL
FASTING STATUS PATIENT QL REPORTED: YES
FASTING STATUS PATIENT QL REPORTED: YES
GLUCOSE SERPL-MCNC: 137 MG/DL (ref 70–99)
HBA1C MFR BLD: 6.7 %
HCO3 SERPL-SCNC: 26 MMOL/L (ref 22–29)
HDLC SERPL-MCNC: 42 MG/DL
LDLC SERPL CALC-MCNC: 56 MG/DL
MICROALBUMIN UR-MCNC: <12 MG/L
MICROALBUMIN/CREAT UR: NORMAL MG/G{CREAT}
NONHDLC SERPL-MCNC: 75 MG/DL
POTASSIUM SERPL-SCNC: 5.4 MMOL/L (ref 3.4–5.3)
SODIUM SERPL-SCNC: 143 MMOL/L (ref 135–145)
TRIGL SERPL-MCNC: 95 MG/DL

## 2024-10-01 PROCEDURE — 36415 COLL VENOUS BLD VENIPUNCTURE: CPT | Mod: ZL | Performed by: INTERNAL MEDICINE

## 2024-10-01 PROCEDURE — G0008 ADMIN INFLUENZA VIRUS VAC: HCPCS

## 2024-10-01 PROCEDURE — 82043 UR ALBUMIN QUANTITATIVE: CPT | Mod: ZL | Performed by: INTERNAL MEDICINE

## 2024-10-01 PROCEDURE — 83036 HEMOGLOBIN GLYCOSYLATED A1C: CPT | Mod: ZL | Performed by: INTERNAL MEDICINE

## 2024-10-01 PROCEDURE — 99214 OFFICE O/P EST MOD 30 MIN: CPT | Performed by: INTERNAL MEDICINE

## 2024-10-01 PROCEDURE — 80048 BASIC METABOLIC PNL TOTAL CA: CPT | Mod: ZL | Performed by: INTERNAL MEDICINE

## 2024-10-01 PROCEDURE — G0463 HOSPITAL OUTPT CLINIC VISIT: HCPCS | Mod: 25

## 2024-10-01 PROCEDURE — 90480 ADMN SARSCOV2 VAC 1/ONLY CMP: CPT

## 2024-10-01 PROCEDURE — 82465 ASSAY BLD/SERUM CHOLESTEROL: CPT | Mod: ZL | Performed by: INTERNAL MEDICINE

## 2024-10-01 PROCEDURE — G2211 COMPLEX E/M VISIT ADD ON: HCPCS | Performed by: INTERNAL MEDICINE

## 2024-10-01 ASSESSMENT — PAIN SCALES - GENERAL: PAINLEVEL: NO PAIN (0)

## 2024-10-01 NOTE — PROGRESS NOTES
"Assessment & Plan   1. Type 2 diabetes mellitus without complication, without long-term current use of insulin (H)  At goal, no change.  Discussed the possibility of switching from glipizide to GLP-1 patient declined.  - Lipid Profile; Future  - Albumin Random Urine Quantitative with Creat Ratio; Future  - Basic metabolic panel; Future  - Hemoglobin A1c; Future  - Lipid Profile  - Albumin Random Urine Quantitative with Creat Ratio  - Basic metabolic panel  - Hemoglobin A1c    2. Essential (primary) hypertension  Blood pressure at goal  - Basic metabolic panel; Future  - Basic metabolic panel    3. Mixed hyperlipidemia  Continue statin    4. PALOMO (acute kidney injury) (H)  5. Hyperkalemia  After the visit the patient had blood work consistent with an acute kidney injury with a pattern of dehydration.  I encouraged increasing oral intake of fluids.  I recommend discontinuation of NSAIDs.  Close follow-up for recheck is recommended.  - Basic metabolic panel; Future      There are no Patient Instructions on file for this visit.    Return in about 7 months (around 5/1/2025) for medicare wellness visit.    Signed, Deven Canseco MD, FAAP, FACP  Internal Medicine & Pediatrics    Subjective   Rosalio Pierre is a 68 year old male who presents for Diabetes.    Objective   Vitals: /70   Pulse 70   Temp 97.5  F (36.4  C) (Tympanic)   Resp 16   Ht 1.74 m (5' 8.5\")   Wt 85.8 kg (189 lb 3.2 oz)   SpO2 96%   BMI 28.35 kg/m        Review and Analysis of Data   I personally reviewed the following:  External notes: No  Results: Yes diabetic lab  Use of an independent historian: No  Independent review of a test performed by another physician: No  Discussion of management with another physician: No  Moderate risk of morbidity from additional diagnostic testing and/or treatment.    "

## 2024-10-01 NOTE — NURSING NOTE
"Chief Complaint   Patient presents with    Diabetes       Initial /70   Pulse 70   Temp 97.5  F (36.4  C) (Tympanic)   Resp 16   Ht 1.74 m (5' 8.5\")   Wt 85.8 kg (189 lb 3.2 oz)   SpO2 96%   BMI 28.35 kg/m   Estimated body mass index is 28.35 kg/m  as calculated from the following:    Height as of this encounter: 1.74 m (5' 8.5\").    Weight as of this encounter: 85.8 kg (189 lb 3.2 oz).  Medication Review: complete    The next two questions are to help us understand your food security.  If you are feeling you need any assistance in this area, we have resources available to support you today.          4/29/2024   SDOH- Food Insecurity   Within the past 12 months, did you worry that your food would run out before you got money to buy more? N   Within the past 12 months, did the food you bought just not last and you didn t have money to get more? N            Health Care Directive:  Patient does not have a Health Care Directive or Living Will: Discussed advance care planning with patient; however, patient declined at this time.    Norma J. Gosselin, LPN      "

## 2024-10-16 ENCOUNTER — LAB (OUTPATIENT)
Dept: LAB | Facility: OTHER | Age: 69
End: 2024-10-16
Attending: INTERNAL MEDICINE
Payer: MEDICARE

## 2024-10-16 DIAGNOSIS — N17.9 AKI (ACUTE KIDNEY INJURY) (H): ICD-10-CM

## 2024-10-16 DIAGNOSIS — E87.5 HYPERKALEMIA: ICD-10-CM

## 2024-10-16 LAB
ANION GAP SERPL CALCULATED.3IONS-SCNC: 9 MMOL/L (ref 7–15)
BUN SERPL-MCNC: 25.2 MG/DL (ref 8–23)
CALCIUM SERPL-MCNC: 9.5 MG/DL (ref 8.8–10.4)
CHLORIDE SERPL-SCNC: 106 MMOL/L (ref 98–107)
CREAT SERPL-MCNC: 1.36 MG/DL (ref 0.67–1.17)
EGFRCR SERPLBLD CKD-EPI 2021: 56 ML/MIN/1.73M2
GLUCOSE SERPL-MCNC: 148 MG/DL (ref 70–99)
HCO3 SERPL-SCNC: 27 MMOL/L (ref 22–29)
POTASSIUM SERPL-SCNC: 4.8 MMOL/L (ref 3.4–5.3)
SODIUM SERPL-SCNC: 142 MMOL/L (ref 135–145)

## 2024-10-16 PROCEDURE — 80048 BASIC METABOLIC PNL TOTAL CA: CPT | Mod: ZL

## 2024-10-16 PROCEDURE — 36415 COLL VENOUS BLD VENIPUNCTURE: CPT | Mod: ZL

## 2024-10-17 DIAGNOSIS — I10 ESSENTIAL (PRIMARY) HYPERTENSION: ICD-10-CM

## 2024-10-17 DIAGNOSIS — N17.9 AKI (ACUTE KIDNEY INJURY) (H): Primary | ICD-10-CM

## 2024-10-17 RX ORDER — RAMIPRIL 5 MG/1
5 CAPSULE ORAL DAILY
Qty: 90 CAPSULE | Refills: 4 | Status: SHIPPED | OUTPATIENT
Start: 2024-10-17

## 2024-10-28 DIAGNOSIS — E11.9 TYPE 2 DIABETES MELLITUS WITHOUT COMPLICATION, WITHOUT LONG-TERM CURRENT USE OF INSULIN (H): ICD-10-CM

## 2024-10-28 DIAGNOSIS — E78.2 MIXED HYPERLIPIDEMIA: ICD-10-CM

## 2024-10-30 ENCOUNTER — LAB (OUTPATIENT)
Dept: LAB | Facility: OTHER | Age: 69
End: 2024-10-30
Attending: INTERNAL MEDICINE
Payer: MEDICARE

## 2024-10-30 DIAGNOSIS — I10 ESSENTIAL (PRIMARY) HYPERTENSION: ICD-10-CM

## 2024-10-30 DIAGNOSIS — N17.9 AKI (ACUTE KIDNEY INJURY) (H): ICD-10-CM

## 2024-10-30 LAB
ANION GAP SERPL CALCULATED.3IONS-SCNC: 11 MMOL/L (ref 7–15)
BUN SERPL-MCNC: 33.6 MG/DL (ref 8–23)
CALCIUM SERPL-MCNC: 9.9 MG/DL (ref 8.8–10.4)
CHLORIDE SERPL-SCNC: 107 MMOL/L (ref 98–107)
CREAT SERPL-MCNC: 1.43 MG/DL (ref 0.67–1.17)
EGFRCR SERPLBLD CKD-EPI 2021: 53 ML/MIN/1.73M2
GLUCOSE SERPL-MCNC: 104 MG/DL (ref 70–99)
HCO3 SERPL-SCNC: 26 MMOL/L (ref 22–29)
POTASSIUM SERPL-SCNC: 5.1 MMOL/L (ref 3.4–5.3)
SODIUM SERPL-SCNC: 144 MMOL/L (ref 135–145)

## 2024-10-30 PROCEDURE — 36415 COLL VENOUS BLD VENIPUNCTURE: CPT | Mod: ZL

## 2024-10-30 PROCEDURE — 80048 BASIC METABOLIC PNL TOTAL CA: CPT | Mod: ZL

## 2024-10-30 RX ORDER — SIMVASTATIN 20 MG
20 TABLET ORAL AT BEDTIME
Qty: 90 TABLET | Refills: 1 | Status: SHIPPED | OUTPATIENT
Start: 2024-10-30

## 2024-11-19 DIAGNOSIS — E11.9 TYPE 2 DIABETES MELLITUS WITHOUT COMPLICATION, WITHOUT LONG-TERM CURRENT USE OF INSULIN (H): ICD-10-CM

## 2024-11-19 NOTE — TELEPHONE ENCOUNTER
[id number] sent Rx request for the following:      Requested Prescriptions   Pending Prescriptions Disp Refills    metFORMIN (GLUCOPHAGE) 500 MG tablet [Pharmacy Med Name: metFORMIN HCl 500 MG Oral Tablet] 360 tablet 0     Sig: TAKE 2 TABLETS BY MOUTH TWICE DAILY WITH MEALS       Biguanide Agents Failed - 11/19/2024  3:11 PM        Failed - Has GFR on file in past 12 months and most recent value is normal        Passed - Patient is age 10 or older        Passed - Patient has documented A1c within the specified period of time.     If HgbA1C is 8 or greater, it needs to be on file within the past 3 months.  If less than 8, must be on file within the past 6 months.     Recent Labs   Lab Test 10/01/24  0912 11/08/18  1144 11/06/17  0855   A1C 6.7*   < >  --    [id number]  --   --  6.0    < > = values in this interval not displayed.             Passed - Patient does NOT have a diagnosis of CHF.        Passed - Medication is active on med list        Passed - Medication indicated for associated diagnosis     Medication is associated with one or more of the following diagnoses:     Gestational diabetes mellitus     Hyperinsulinar obesity     Hypersecretion of ovarian androgens    Non-alcoholic fatty liver    Polycystic ovarian syndrome               Pre-diabetes (DM 2 prevention)    Type 2 diabetes mellitus     Weight gain, antipsychotic therapy-induced    Impaired fasting glucose          Passed - Recent (6 mo) or future (90 days) visit within the authorizing provider's specialty     The patient must have completed an in-person or virtual visit within the past 6 months or has a future visit scheduled within the next 90 days with the authorizing provider s specialty.  Urgent care and e-visits do not quality as an office visit for this protocol.               Last Prescription Date:   9/17/24  Last Fill Qty/Refills:         360, R-0    Last Office Visit:              10/1/24   Future Office visit:            none noted  Routing to PCP/provider for refill consideration/determination for appointment. Failed protocol.  Roxy Luna RN ....................  11/19/2024   3:12 PM

## 2025-01-06 DIAGNOSIS — E11.9 TYPE 2 DIABETES MELLITUS WITHOUT COMPLICATION, WITHOUT LONG-TERM CURRENT USE OF INSULIN (H): ICD-10-CM

## 2025-01-07 NOTE — TELEPHONE ENCOUNTER
[facility] sent Rx request for the following:      Requested Prescriptions   Pending Prescriptions Disp Refills    CONTOUR NEXT TEST test strip [Pharmacy Med Name: Contour Next Test In Vitro Strip]  0     Sig: USE TO CHECK GLUCOSE TWICE DAILY       Diabetic Supplies Protocol Passed - 1/7/2025  3:00 PM     Last Prescription Date:   12/21/23  Last Fill Qty/Refills:         100, R-8    Last Office Visit:              10/1/24  Future Office visit:            5/1/25    Unable to complete prescription refill per RN Medication Refill Policy.     Dahiana Navarro, RN on 1/7/2025 at 3:03 PM

## 2025-03-04 DIAGNOSIS — E78.2 MIXED HYPERLIPIDEMIA: ICD-10-CM

## 2025-03-04 DIAGNOSIS — E11.9 TYPE 2 DIABETES MELLITUS WITHOUT COMPLICATION, WITHOUT LONG-TERM CURRENT USE OF INSULIN (H): ICD-10-CM

## 2025-03-06 RX ORDER — SIMVASTATIN 20 MG
20 TABLET ORAL AT BEDTIME
Qty: 90 TABLET | Refills: 1 | Status: SHIPPED | OUTPATIENT
Start: 2025-03-06

## 2025-03-06 NOTE — TELEPHONE ENCOUNTER
[id number] sent Rx request for the following:      Requested Prescriptions   Pending Prescriptions Disp Refills    simvastatin (ZOCOR) 20 MG tablet 90 tablet 1     Sig: Take 1 tablet (20 mg) by mouth at bedtime.       Antihyperlipidemic agents Passed - 3/6/2025  9:58 AM     Last Prescription Date:   10/30/2024  Last Fill Qty/Refills:         90, R-1    Last Office Visit:              10/1/2024   Future Office visit:           3/10/2025    Prescription approved per [facility] Refill Protocol.  Lei Greene RN on 3/6/2025 at 9:59 AM    
Bed in lowest position, wheels locked, appropriate side rails in place/Call bell, personal items and telephone in reach/Instruct patient to call for assistance before getting out of bed or chair/Non-slip footwear when patient is out of bed/[place] to call system/Physically safe environment - no spills, clutter or unnecessary equipment/Purposeful Proactive Rounding/Room/bathroom lighting operational, light cord in reach

## 2025-03-10 ENCOUNTER — OFFICE VISIT (OUTPATIENT)
Dept: PEDIATRICS | Facility: OTHER | Age: 70
End: 2025-03-10
Attending: INTERNAL MEDICINE
Payer: MEDICARE

## 2025-03-10 VITALS
BODY MASS INDEX: 28.9 KG/M2 | DIASTOLIC BLOOD PRESSURE: 68 MMHG | SYSTOLIC BLOOD PRESSURE: 124 MMHG | TEMPERATURE: 98.1 F | OXYGEN SATURATION: 97 % | WEIGHT: 192.9 LBS | RESPIRATION RATE: 16 BRPM | HEART RATE: 84 BPM

## 2025-03-10 DIAGNOSIS — N18.31 CKD STAGE 3A, GFR 45-59 ML/MIN (H): Primary | ICD-10-CM

## 2025-03-10 DIAGNOSIS — E11.9 TYPE 2 DIABETES MELLITUS WITHOUT COMPLICATION, WITHOUT LONG-TERM CURRENT USE OF INSULIN (H): ICD-10-CM

## 2025-03-10 LAB
ANION GAP SERPL CALCULATED.3IONS-SCNC: 10 MMOL/L (ref 7–15)
BUN SERPL-MCNC: 21.2 MG/DL (ref 8–23)
CALCIUM SERPL-MCNC: 9.9 MG/DL (ref 8.8–10.4)
CHLORIDE SERPL-SCNC: 104 MMOL/L (ref 98–107)
CREAT SERPL-MCNC: 1.28 MG/DL (ref 0.67–1.17)
EGFRCR SERPLBLD CKD-EPI 2021: 60 ML/MIN/1.73M2
EST. AVERAGE GLUCOSE BLD GHB EST-MCNC: 146 MG/DL
GLUCOSE SERPL-MCNC: 156 MG/DL (ref 70–99)
HBA1C MFR BLD: 6.7 %
HCO3 SERPL-SCNC: 24 MMOL/L (ref 22–29)
POTASSIUM SERPL-SCNC: 5 MMOL/L (ref 3.4–5.3)
SODIUM SERPL-SCNC: 138 MMOL/L (ref 135–145)

## 2025-03-10 PROCEDURE — 36415 COLL VENOUS BLD VENIPUNCTURE: CPT | Mod: ZL | Performed by: INTERNAL MEDICINE

## 2025-03-10 PROCEDURE — 80048 BASIC METABOLIC PNL TOTAL CA: CPT | Mod: ZL | Performed by: INTERNAL MEDICINE

## 2025-03-10 PROCEDURE — G0463 HOSPITAL OUTPT CLINIC VISIT: HCPCS

## 2025-03-10 PROCEDURE — 83036 HEMOGLOBIN GLYCOSYLATED A1C: CPT | Mod: ZL | Performed by: INTERNAL MEDICINE

## 2025-03-10 PROCEDURE — 82435 ASSAY OF BLOOD CHLORIDE: CPT | Mod: ZL | Performed by: INTERNAL MEDICINE

## 2025-03-10 ASSESSMENT — PAIN SCALES - GENERAL: PAINLEVEL_OUTOF10: NO PAIN (0)

## 2025-03-10 NOTE — PROGRESS NOTES
Assessment & Plan     Assessment & Plan  Chronic Kidney Disease (CKD)  Recent decline in kidney function with GFR at 56 and creatinine at 1.37 from 1.17 in October. Etiology unclear but may involve dehydration or hypotension. Diabetes well controlled (A1c 6.7), unlikely cause. No frequent NSAID use or diuretics. Ramipril reduced to 5 mg; blood pressure remains well controlled. Possible acute kidney injury last summer due to exertion and dehydration.  - Repeat blood work to assess kidney function  - Consider nephrology referral if further decline  - Advise hydration, especially during physical activity or hot weather    Diabetes Mellitus Type 2  Well controlled with A1c of 6.7.  - Continue current diabetes management    Hypertension  Well controlled with current regimen. Ramipril reduced to 5 mg without adverse effects.  - Continue current antihypertensive regimen    General Health Maintenance  Scheduled wellness visit on May 1st.  - Attend wellness visit on May 1st    Follow-up  - Repeat blood work  - Attend wellness visit on May 1st.    There are no Patient Instructions on file for this visit.    Return in about 1 month (around 4/10/2025) for medicare wellness visit.    Signed, Deven Canseco MD, FAAP, FACP  Internal Medicine & Pediatrics    Subjective   Rosalio Pierre is a 69 year old male who presents for Follow Up (kidneys).    History of Present Illness  The patient, with a history of diabetes and hypertension, presents for a follow-up visit concerning kidney function. The patient's kidney function had shown a decline in the previous visit in October, with a GFR of 56 and creatinine of 1.37. The patient's diabetes is well controlled with an A1c of 6.7. The patient's blood pressure medication, ramipril, was reduced from 10mg to 5mg after the last visit. The patient denies feeling sick over the winter and has not been using ibuprofen or Aleve much. The patient is also on metformin for diabetes. The patient is  planning to move to [place] in the spring and has a wellness check scheduled for the first of May.    Objective   Vitals: /68   Pulse 84   Temp 98.1  F (36.7  C) (Tympanic)   Resp 16   Wt 87.5 kg (192 lb 14.4 oz)   SpO2 97%   BMI 28.90 kg/m      Physical Exam        Results  LABS  GFR: 56 (10/2024)  Creatinine: 1.37 (10/2024)  Creatinine: 1.17 (05/2024)  HbA1c: 6.7        Consent was obtained from the patient/parent to use an AI documentation tool in the creation of this note.

## 2025-03-10 NOTE — NURSING NOTE
"Chief Complaint   Patient presents with    Follow Up     kidneys       Initial /68   Pulse 84   Temp 98.1  F (36.7  C) (Tympanic)   Resp 16   Wt 87.5 kg (192 lb 14.4 oz)   SpO2 97%   BMI 28.90 kg/m   Estimated body mass index is 28.9 kg/m  as calculated from the following:    Height as of 10/1/24: 1.74 m (5' 8.5\").    Weight as of this encounter: 87.5 kg (192 lb 14.4 oz).  Medication Review: complete    The next two questions are to help us understand your food security.  If you are feeling you need any assistance in this area, we have resources available to support you today.          4/29/2024   SDOH- Food Insecurity   Within the past 12 months, did you worry that your food would run out before you got money to buy more? N   Within the past 12 months, did the food you bought just not last and you didn t have money to get more? N         Health Care Directive:  Patient does not have a Health Care Directive: Discussed advance care planning with patient; however, patient declined at this time.    Norma J. Gosselin, LPN      "

## 2025-03-19 ENCOUNTER — TRANSFERRED RECORDS (OUTPATIENT)
Dept: HEALTH INFORMATION MANAGEMENT | Facility: OTHER | Age: 70
End: 2025-03-19
Payer: COMMERCIAL